# Patient Record
Sex: MALE | Race: WHITE | NOT HISPANIC OR LATINO | Employment: FULL TIME | URBAN - METROPOLITAN AREA
[De-identification: names, ages, dates, MRNs, and addresses within clinical notes are randomized per-mention and may not be internally consistent; named-entity substitution may affect disease eponyms.]

---

## 2017-11-15 ENCOUNTER — OFFICE VISIT (OUTPATIENT)
Dept: SPORTS MEDICINE | Facility: CLINIC | Age: 57
End: 2017-11-15

## 2017-11-15 ENCOUNTER — RESULTS ENCOUNTER (OUTPATIENT)
Dept: SPORTS MEDICINE | Facility: CLINIC | Age: 57
End: 2017-11-15

## 2017-11-15 VITALS
BODY MASS INDEX: 23.62 KG/M2 | DIASTOLIC BLOOD PRESSURE: 86 MMHG | WEIGHT: 165 LBS | HEIGHT: 70 IN | SYSTOLIC BLOOD PRESSURE: 104 MMHG | OXYGEN SATURATION: 98 % | HEART RATE: 62 BPM | RESPIRATION RATE: 16 BRPM

## 2017-11-15 DIAGNOSIS — Z00.00 ANNUAL PHYSICAL EXAM: Primary | ICD-10-CM

## 2017-11-15 DIAGNOSIS — Z00.00 ANNUAL PHYSICAL EXAM: ICD-10-CM

## 2017-11-15 PROCEDURE — 99396 PREV VISIT EST AGE 40-64: CPT | Performed by: FAMILY MEDICINE

## 2017-11-15 PROCEDURE — 90715 TDAP VACCINE 7 YRS/> IM: CPT | Performed by: FAMILY MEDICINE

## 2017-11-15 PROCEDURE — 90471 IMMUNIZATION ADMIN: CPT | Performed by: FAMILY MEDICINE

## 2017-11-15 RX ORDER — CETIRIZINE HYDROCHLORIDE 10 MG/1
10 TABLET ORAL DAILY
COMMUNITY

## 2017-11-15 NOTE — PROGRESS NOTES
"Yevgeniy Rodriguez is here today to establish care, for an annual physical exam. Saw Dr. Gomez a few years ago.    Eating a healthy diet. Exercising routinely. Runs sprint triathlons. Exercises 5 days a week.    Mother dx with liver CA age 64.  She was having excessive bruising that was not resolving at the onset of her diagnosis.    I have reviewed the patient's medical, family, and social history in detail and updated the computerized patient record.    Screening history:  Colonoscopy - @ age 51, polyps, hemorrhoids  Prostate - no fam hx  Metabolic - last year    Health Maintenance   Topic Date Due   • TDAP/TD VACCINES (1 - Tdap) 12/04/1980   • HEPATITIS C SCREENING  11/15/2017   • COLONOSCOPY  01/01/2022   • INFLUENZA VACCINE  Completed       Review of Systems   Constitutional: Negative for chills, fatigue and fever.   HENT: Negative for postnasal drip and sore throat.    Eyes: Negative for pain.   Respiratory: Negative for cough, chest tightness and wheezing.    Cardiovascular: Negative for chest pain.   Gastrointestinal: Negative.    Musculoskeletal: Negative for myalgias.   Skin: Negative for rash.   Neurological: Negative for numbness and headaches.   Psychiatric/Behavioral: Negative.    All other systems reviewed and are negative.      /86 (BP Location: Right arm, Patient Position: Sitting, Cuff Size: Adult)  Pulse 62  Resp 16  Ht 70\" (177.8 cm)  Wt 165 lb (74.8 kg)  SpO2 98%  BMI 23.68 kg/m2     Physical Exam    Vital signs reviewed.  General appearance: No acute distress  Eyes: conjunctiva clear without erythema; pupils equally round and reactive  ENT: external ears and nose normal; hearing normal, oropharynx clear  Neck: supple; no thyromegaly  CV: normal rate and rhythm; no peripheral edema  Respiratory: normal respiratory effort; lungs clear to auscultation bilaterally  MSK: normal gait and station; no focal joint deformity or swelling  Skin: no rash or wounds; normal turgor  Neuro: cranial " nerves 2-12 grossly intact; normal sensation to light touch  Psych: mood and affect normal; recent and remote memory intact      Yevgeniy was seen today for establish care.    Diagnoses and all orders for this visit:    Annual physical exam  -     Tdap Vaccine Greater Than or Equal To 8yo IM  -     Comprehensive Metabolic Panel; Future  -     Lipid Panel; Future  -     Urinalysis With / Culture If Indicated - Urine, Clean Catch; Future  -     PSA; Future  -     CBC & Differential; Future  -     Hepatitis C antibody; Future        Encourage healthy diet and exercise.  Encourage patient to stay up to date on screening examinations as indicated based on age and risk factors.    EMR Dragon/Transcription disclaimer:    Much of this encounter note is an electronic transcription/translation of spoken language to printed text.  The electronic translation of spoken language may permit erroneous, or at times, nonsensical words or phrases to be inadvertently transcribed.  Although I have reviewed the note for such errors some may still exist.

## 2017-11-20 ENCOUNTER — RESULTS ENCOUNTER (OUTPATIENT)
Dept: SPORTS MEDICINE | Facility: CLINIC | Age: 57
End: 2017-11-20

## 2017-11-20 DIAGNOSIS — Z00.00 ANNUAL PHYSICAL EXAM: ICD-10-CM

## 2017-11-23 LAB
ALBUMIN SERPL-MCNC: 4.4 G/DL (ref 3.5–5.2)
ALBUMIN/GLOB SERPL: 1.8 G/DL
ALP SERPL-CCNC: 57 U/L (ref 39–117)
ALT SERPL-CCNC: 16 U/L (ref 1–41)
AST SERPL-CCNC: 15 U/L (ref 1–40)
BASOPHILS # BLD AUTO: 0.03 10*3/MM3 (ref 0–0.2)
BASOPHILS NFR BLD AUTO: 0.7 % (ref 0–1.5)
BILIRUB SERPL-MCNC: 0.6 MG/DL (ref 0.1–1.2)
BUN SERPL-MCNC: 15 MG/DL (ref 6–20)
BUN/CREAT SERPL: 15.6 (ref 7–25)
CALCIUM SERPL-MCNC: 9.6 MG/DL (ref 8.6–10.5)
CHLORIDE SERPL-SCNC: 100 MMOL/L (ref 98–107)
CHOLEST SERPL-MCNC: 173 MG/DL (ref 0–200)
CO2 SERPL-SCNC: 28 MMOL/L (ref 22–29)
CREAT SERPL-MCNC: 0.96 MG/DL (ref 0.76–1.27)
EOSINOPHIL # BLD AUTO: 0.09 10*3/MM3 (ref 0–0.7)
EOSINOPHIL NFR BLD AUTO: 2.2 % (ref 0.3–6.2)
ERYTHROCYTE [DISTWIDTH] IN BLOOD BY AUTOMATED COUNT: 12.9 % (ref 11.5–14.5)
GFR SERPLBLD CREATININE-BSD FMLA CKD-EPI: 81 ML/MIN/1.73
GFR SERPLBLD CREATININE-BSD FMLA CKD-EPI: 99 ML/MIN/1.73
GLOBULIN SER CALC-MCNC: 2.4 GM/DL
GLUCOSE SERPL-MCNC: 91 MG/DL (ref 65–99)
HCT VFR BLD AUTO: 42.5 % (ref 40.4–52.2)
HCV AB S/CO SERPL IA: 0.1 S/CO RATIO (ref 0–0.9)
HDLC SERPL-MCNC: 56 MG/DL (ref 40–60)
HGB BLD-MCNC: 13.9 G/DL (ref 13.7–17.6)
IMM GRANULOCYTES # BLD: 0 10*3/MM3 (ref 0–0.03)
IMM GRANULOCYTES NFR BLD: 0 % (ref 0–0.5)
LDLC SERPL CALC-MCNC: 96 MG/DL (ref 0–100)
LYMPHOCYTES # BLD AUTO: 1.68 10*3/MM3 (ref 0.9–4.8)
LYMPHOCYTES NFR BLD AUTO: 41.1 % (ref 19.6–45.3)
MCH RBC QN AUTO: 31.2 PG (ref 27–32.7)
MCHC RBC AUTO-ENTMCNC: 32.7 G/DL (ref 32.6–36.4)
MCV RBC AUTO: 95.5 FL (ref 79.8–96.2)
MONOCYTES # BLD AUTO: 0.39 10*3/MM3 (ref 0.2–1.2)
MONOCYTES NFR BLD AUTO: 9.5 % (ref 5–12)
NEUTROPHILS # BLD AUTO: 1.9 10*3/MM3 (ref 1.9–8.1)
NEUTROPHILS NFR BLD AUTO: 46.5 % (ref 42.7–76)
PLATELET # BLD AUTO: 241 10*3/MM3 (ref 140–500)
POTASSIUM SERPL-SCNC: 4.8 MMOL/L (ref 3.5–5.2)
PROT SERPL-MCNC: 6.8 G/DL (ref 6–8.5)
PSA SERPL-MCNC: 0.78 NG/ML (ref 0–4)
RBC # BLD AUTO: 4.45 10*6/MM3 (ref 4.6–6)
SODIUM SERPL-SCNC: 140 MMOL/L (ref 136–145)
TRIGL SERPL-MCNC: 106 MG/DL (ref 0–150)
VLDLC SERPL CALC-MCNC: 21.2 MG/DL (ref 5–40)
WBC # BLD AUTO: 4.09 10*3/MM3 (ref 4.5–10.7)

## 2018-01-11 ENCOUNTER — OFFICE VISIT (OUTPATIENT)
Dept: SPORTS MEDICINE | Facility: CLINIC | Age: 58
End: 2018-01-11

## 2018-01-11 VITALS
OXYGEN SATURATION: 98 % | WEIGHT: 178.8 LBS | SYSTOLIC BLOOD PRESSURE: 114 MMHG | DIASTOLIC BLOOD PRESSURE: 62 MMHG | HEART RATE: 60 BPM | TEMPERATURE: 98.7 F | BODY MASS INDEX: 25.6 KG/M2 | HEIGHT: 70 IN

## 2018-01-11 DIAGNOSIS — J01.00 ACUTE NON-RECURRENT MAXILLARY SINUSITIS: Primary | ICD-10-CM

## 2018-01-11 DIAGNOSIS — R05.9 COUGH: ICD-10-CM

## 2018-01-11 PROCEDURE — 99214 OFFICE O/P EST MOD 30 MIN: CPT | Performed by: FAMILY MEDICINE

## 2018-01-11 PROCEDURE — 71046 X-RAY EXAM CHEST 2 VIEWS: CPT | Performed by: FAMILY MEDICINE

## 2018-01-11 RX ORDER — AMOXICILLIN AND CLAVULANATE POTASSIUM 875; 125 MG/1; MG/1
1 TABLET, FILM COATED ORAL 2 TIMES DAILY
Qty: 20 TABLET | Refills: 0 | Status: SHIPPED | OUTPATIENT
Start: 2018-01-11 | End: 2018-01-21

## 2018-01-11 NOTE — PROGRESS NOTES
"Yevgeniy is a 57 y.o. year old male    Chief Complaint   Patient presents with   • Cough     coughing up green and brown mucus    • Fatigue       History of Present Illness   Cough   This is a new problem. The current episode started 1 to 4 weeks ago. The problem has been gradually improving. The cough is productive of brown sputum. Associated symptoms include nasal congestion and postnasal drip. Pertinent negatives include no chest pain, chills, fever, headaches, myalgias, rash, rhinorrhea or shortness of breath.   Fatigue   Associated symptoms include coughing and fatigue. Pertinent negatives include no abdominal pain, arthralgias, chest pain, chills, congestion, fever, headaches, myalgias, numbness or rash.      Treated for flu prior to [place].    I have reviewed the patient's medical, family, and social history in detail and updated the computerized patient record.    Review of Systems   Constitutional: Positive for fatigue. Negative for chills and fever.   HENT: Positive for postnasal drip. Negative for congestion, rhinorrhea and sinus pressure.    Respiratory: Positive for cough. Negative for chest tightness and shortness of breath.    Cardiovascular: Negative for chest pain.   Gastrointestinal: Negative for abdominal pain.   Musculoskeletal: Negative for arthralgias and myalgias.   Skin: Negative for rash.   Neurological: Negative for numbness and headaches.   All other systems reviewed and are negative.      /62 (BP Location: Left arm, Patient Position: Sitting, Cuff Size: Adult)  Pulse 60  Temp 98.7 °F (37.1 °C) (Oral)   Ht 177.8 cm (70\")  Wt 81.1 kg (178 lb 12.8 oz)  SpO2 98%  BMI 25.66 kg/m2     Physical Exam   Constitutional: He is oriented to person, place, and time. He appears well-developed and well-nourished.   HENT:   Head: Normocephalic and atraumatic.   Right Ear: External ear normal.   Left Ear: External ear normal.   Nose: Nose normal.   Mouth/Throat: Oropharynx is clear and moist. "   Eyes: EOM are normal.   Neck: Normal range of motion.   Cardiovascular: Normal rate and regular rhythm.    Pulmonary/Chest: Effort normal and breath sounds normal.   Neurological: He is alert and oriented to person, place, and time.   Skin: Skin is warm and dry. No rash noted.   Psychiatric: He has a normal mood and affect. His behavior is normal.   Nursing note and vitals reviewed.    Chest X-Ray  Indication: Cough  Views: PA and Lateral    Findings:  Normal lung markings.  No pleural effusion.  Heart size and shape are normal.  Mediastinum is normal.  No visible rib fractures.   Overall, normal chest.    There were no previous studies available for comparison.       Diagnoses and all orders for this visit:    Acute non-recurrent maxillary sinusitis  -     amoxicillin-clavulanate (AUGMENTIN) 875-125 MG per tablet; Take 1 tablet by mouth 2 (Two) Times a Day for 10 days.    Cough  -     XR Chest 2 View             EMR Dragon/Transcription disclaimer:    Much of this encounter note is an electronic transcription/translation of spoken language to printed text.  The electronic translation of spoken language may permit erroneous, or at times, nonsensical words or phrases to be inadvertently transcribed.  Although I have reviewed the note for such errors some may still exist.

## 2019-04-04 ENCOUNTER — OFFICE VISIT (OUTPATIENT)
Dept: SPORTS MEDICINE | Facility: CLINIC | Age: 59
End: 2019-04-04

## 2019-04-04 VITALS
HEIGHT: 70 IN | WEIGHT: 178 LBS | DIASTOLIC BLOOD PRESSURE: 70 MMHG | OXYGEN SATURATION: 97 % | HEART RATE: 62 BPM | BODY MASS INDEX: 25.48 KG/M2 | SYSTOLIC BLOOD PRESSURE: 114 MMHG

## 2019-04-04 DIAGNOSIS — Z23 IMMUNIZATION DUE: ICD-10-CM

## 2019-04-04 DIAGNOSIS — Z00.00 ANNUAL PHYSICAL EXAM: Primary | ICD-10-CM

## 2019-04-04 PROCEDURE — 99396 PREV VISIT EST AGE 40-64: CPT | Performed by: FAMILY MEDICINE

## 2019-04-04 NOTE — PROGRESS NOTES
"Yevgeniy Rodriguez is here today for an annual physical exam.     Eating a healthy diet. Exercising routinely.     Biometric screening next wk.    Seeing Dr. Giuliana Carrasco for HRT. Considering testosterone replacement next wk per pt. Was going to 25 again in past but quit on own volition.    I have reviewed the patient's medical, family, and social history in detail and updated the computerized patient record.    Health Maintenance   Topic Date Due   • ZOSTER VACCINE (1 of 2) 12/04/2011   • ANNUAL PHYSICAL  11/16/2018   • INFLUENZA VACCINE  08/01/2019   • COLONOSCOPY  01/01/2022   • TDAP/TD VACCINES (2 - Td) 11/15/2027   • HEPATITIS C SCREENING  Completed       PHQ-2 Depression Screening  Little interest or pleasure in doing things?     Feeling down, depressed, or hopeless?     PHQ-2 Total Score         Review of Systems  Constitutional: Negative for chills, fatigue and fever.   HENT: Negative for postnasal drip and sore throat.    Eyes: Negative for pain.   Respiratory: Negative for cough, chest tightness and wheezing.    Cardiovascular: Negative for chest pain.   Gastrointestinal: Negative.    Musculoskeletal: Negative for myalgias.   Skin: Negative for rash.   Neurological: Negative for numbness and headaches.   Psychiatric/Behavioral: Negative.    All other systems reviewed and are negative.    /70   Pulse 62   Ht 177.8 cm (70\")   Wt 80.7 kg (178 lb)   SpO2 97%   BMI 25.54 kg/m²      Physical Exam    Vital signs reviewed.  General appearance: No acute distress  Eyes: conjunctiva clear without erythema; pupils equally round and reactive  ENT: external ears and nose normal; hearing normal, oropharynx clear  Neck: supple; no thyromegaly  CV: normal rate and rhythm; no peripheral edema  Respiratory: normal respiratory effort; lungs clear to auscultation bilaterally  MSK: normal gait and station; no focal joint deformity or swelling  Skin: no rash or wounds; normal turgor  Neuro: cranial nerves 2-12 grossly " intact; normal sensation to light touch  Psych: mood and affect normal; recent and remote memory intact  : skin tag at 12 o'clock; no hemorrhoids; JAYCEE WNL    No visits with results within 2 Week(s) from this visit.   Latest known visit with results is:   Results Encounter on 11/20/2017   Component Date Value Ref Range Status   • Glucose 11/22/2017 91  65 - 99 mg/dL Final   • BUN 11/22/2017 15  6 - 20 mg/dL Final   • Creatinine 11/22/2017 0.96  0.76 - 1.27 mg/dL Final   • eGFR Non  Am 11/22/2017 81  >60 mL/min/1.73 Final   • eGFR African Am 11/22/2017 99  >60 mL/min/1.73 Final   • BUN/Creatinine Ratio 11/22/2017 15.6  7.0 - 25.0 Final   • Sodium 11/22/2017 140  136 - 145 mmol/L Final   • Potassium 11/22/2017 4.8  3.5 - 5.2 mmol/L Final   • Chloride 11/22/2017 100  98 - 107 mmol/L Final   • Total CO2 11/22/2017 28.0  22.0 - 29.0 mmol/L Final   • Calcium 11/22/2017 9.6  8.6 - 10.5 mg/dL Final   • Total Protein 11/22/2017 6.8  6.0 - 8.5 g/dL Final   • Albumin 11/22/2017 4.40  3.50 - 5.20 g/dL Final   • Globulin 11/22/2017 2.4  gm/dL Final   • A/G Ratio 11/22/2017 1.8  g/dL Final   • Total Bilirubin 11/22/2017 0.6  0.1 - 1.2 mg/dL Final   • Alkaline Phosphatase 11/22/2017 57  39 - 117 U/L Final   • AST (SGOT) 11/22/2017 15  1 - 40 U/L Final   • ALT (SGPT) 11/22/2017 16  1 - 41 U/L Final   • Total Cholesterol 11/22/2017 173  0 - 200 mg/dL Final   • Triglycerides 11/22/2017 106  0 - 150 mg/dL Final   • HDL Cholesterol 11/22/2017 56  40 - 60 mg/dL Final   • VLDL Cholesterol 11/22/2017 21.2  5 - 40 mg/dL Final   • LDL Cholesterol  11/22/2017 96  0 - 100 mg/dL Final   • PSA 11/22/2017 0.776  0.000 - 4.000 ng/mL Final   • WBC 11/22/2017 4.09* 4.50 - 10.70 10*3/mm3 Final   • RBC 11/22/2017 4.45* 4.60 - 6.00 10*6/mm3 Final   • Hemoglobin 11/22/2017 13.9  13.7 - 17.6 g/dL Final   • Hematocrit 11/22/2017 42.5  40.4 - 52.2 % Final   • MCV 11/22/2017 95.5  79.8 - 96.2 fL Final   • MCH 11/22/2017 31.2  27.0 - 32.7 pg Final    • MCHC 11/22/2017 32.7  32.6 - 36.4 g/dL Final   • RDW 11/22/2017 12.9  11.5 - 14.5 % Final   • Platelets 11/22/2017 241  140 - 500 10*3/mm3 Final   • Neutrophil Rel % 11/22/2017 46.5  42.7 - 76.0 % Final   • Lymphocyte Rel % 11/22/2017 41.1  19.6 - 45.3 % Final   • Monocyte Rel % 11/22/2017 9.5  5.0 - 12.0 % Final   • Eosinophil Rel % 11/22/2017 2.2  0.3 - 6.2 % Final   • Basophil Rel % 11/22/2017 0.7  0.0 - 1.5 % Final   • Neutrophils Absolute 11/22/2017 1.90  1.90 - 8.10 10*3/mm3 Final   • Lymphocytes Absolute 11/22/2017 1.68  0.90 - 4.80 10*3/mm3 Final   • Monocytes Absolute 11/22/2017 0.39  0.20 - 1.20 10*3/mm3 Final   • Eosinophils Absolute 11/22/2017 0.09  0.00 - 0.70 10*3/mm3 Final   • Basophils Absolute 11/22/2017 0.03  0.00 - 0.20 10*3/mm3 Final   • Immature Granulocyte Rel % 11/22/2017 0.0  0.0 - 0.5 % Final   • Immature Grans Absolute 11/22/2017 0.00  0.00 - 0.03 10*3/mm3 Final   • Hep C Virus Ab 11/22/2017 0.1  0.0 - 0.9 s/co ratio Final    Comment:                                   Negative:     < 0.8                               Indeterminate: 0.8 - 0.9                                    Positive:     > 0.9   The CDC recommends that a positive HCV antibody result   be followed up with a HCV Nucleic Acid Amplification   test (026997).          Yevgeniy was seen today for personal problem.    Diagnoses and all orders for this visit:    Annual physical exam    Immunization due  -     Zoster Vac Recomb Adjuvanted (SHINGRIX) 50 MCG/0.5ML reconstituted suspension; Inject 50 mcg into the appropriate muscle as directed by prescriber 1 (One) Time for 1 dose.      Will c/b with blood work that has already been done. Add if indicated.    Encourage healthy diet and exercise.  Encourage patient to stay up to date on screening examinations as indicated based on age and risk factors.    EMR Dragon/Transcription disclaimer:    Much of this encounter note is an electronic transcription/translation of spoken language  to printed text.  The electronic translation of spoken language may permit erroneous, or at times, nonsensical words or phrases to be inadvertently transcribed.  Although I have reviewed the note for such errors some may still exist.

## 2019-04-09 LAB
ALBUMIN SERPL-MCNC: 4.4 G/DL (ref 3.5–5.5)
ALBUMIN/GLOB SERPL: 2 {RATIO} (ref 1.2–2.2)
ALP SERPL-CCNC: 58 IU/L (ref 39–117)
ALT SERPL-CCNC: 20 IU/L (ref 0–44)
APPEARANCE UR: CLEAR
AST SERPL-CCNC: 28 IU/L (ref 0–40)
BACTERIA #/AREA URNS HPF: ABNORMAL /[HPF]
BASOPHILS # BLD AUTO: 0 X10E3/UL (ref 0–0.2)
BASOPHILS NFR BLD AUTO: 1 %
BILIRUB SERPL-MCNC: 0.4 MG/DL (ref 0–1.2)
BILIRUB UR QL STRIP: NEGATIVE
BUN SERPL-MCNC: 16 MG/DL (ref 6–24)
BUN/CREAT SERPL: 16 (ref 9–20)
CALCIUM SERPL-MCNC: 9.1 MG/DL (ref 8.7–10.2)
CHLORIDE SERPL-SCNC: 103 MMOL/L (ref 96–106)
CHOLEST SERPL-MCNC: 169 MG/DL (ref 100–199)
CO2 SERPL-SCNC: 22 MMOL/L (ref 20–29)
COLOR UR: YELLOW
CREAT SERPL-MCNC: 1.03 MG/DL (ref 0.76–1.27)
CRYSTALS URNS MICRO: ABNORMAL
EOSINOPHIL # BLD AUTO: 0.1 X10E3/UL (ref 0–0.4)
EOSINOPHIL NFR BLD AUTO: 2 %
EPI CELLS #/AREA URNS HPF: ABNORMAL /HPF
ERYTHROCYTE [DISTWIDTH] IN BLOOD BY AUTOMATED COUNT: 13.8 % (ref 12.3–15.4)
GLOBULIN SER CALC-MCNC: 2.2 G/DL (ref 1.5–4.5)
GLUCOSE SERPL-MCNC: 96 MG/DL (ref 65–99)
GLUCOSE UR QL: NEGATIVE
HCT VFR BLD AUTO: 41 % (ref 37.5–51)
HDLC SERPL-MCNC: 52 MG/DL
HGB BLD-MCNC: 13.8 G/DL (ref 13–17.7)
HGB UR QL STRIP: NEGATIVE
IMM GRANULOCYTES # BLD AUTO: 0 X10E3/UL (ref 0–0.1)
IMM GRANULOCYTES NFR BLD AUTO: 0 %
KETONES UR QL STRIP: ABNORMAL
LDLC SERPL CALC-MCNC: 101 MG/DL (ref 0–99)
LEUKOCYTE ESTERASE UR QL STRIP: NEGATIVE
LYMPHOCYTES # BLD AUTO: 1.6 X10E3/UL (ref 0.7–3.1)
LYMPHOCYTES NFR BLD AUTO: 41 %
MCH RBC QN AUTO: 30.6 PG (ref 26.6–33)
MCHC RBC AUTO-ENTMCNC: 33.7 G/DL (ref 31.5–35.7)
MCV RBC AUTO: 91 FL (ref 79–97)
MICRO URNS: ABNORMAL
MICRO URNS: ABNORMAL
MONOCYTES # BLD AUTO: 0.5 X10E3/UL (ref 0.1–0.9)
MONOCYTES NFR BLD AUTO: 11 %
MUCOUS THREADS URNS QL MICRO: PRESENT
NEUTROPHILS # BLD AUTO: 1.8 X10E3/UL (ref 1.4–7)
NEUTROPHILS NFR BLD AUTO: 45 %
NITRITE UR QL STRIP: NEGATIVE
PH UR STRIP: 5.5 [PH] (ref 5–7.5)
PLATELET # BLD AUTO: 206 X10E3/UL (ref 150–379)
POTASSIUM SERPL-SCNC: 4.3 MMOL/L (ref 3.5–5.2)
PROT SERPL-MCNC: 6.6 G/DL (ref 6–8.5)
PROT UR QL STRIP: NEGATIVE
PSA SERPL-MCNC: 0.9 NG/ML (ref 0–4)
RBC # BLD AUTO: 4.51 X10E6/UL (ref 4.14–5.8)
RBC #/AREA URNS HPF: ABNORMAL /HPF
SODIUM SERPL-SCNC: 142 MMOL/L (ref 134–144)
SP GR UR: 1.02 (ref 1–1.03)
TRIGL SERPL-MCNC: 82 MG/DL (ref 0–149)
UNIDENT CRYS URNS QL MICRO: PRESENT
URINALYSIS REFLEX: ABNORMAL
UROBILINOGEN UR STRIP-MCNC: 0.2 MG/DL (ref 0.2–1)
VLDLC SERPL CALC-MCNC: 16 MG/DL (ref 5–40)
WBC # BLD AUTO: 4 X10E3/UL (ref 3.4–10.8)
WBC #/AREA URNS HPF: ABNORMAL /HPF

## 2019-05-07 ENCOUNTER — OFFICE VISIT (OUTPATIENT)
Dept: SPORTS MEDICINE | Facility: CLINIC | Age: 59
End: 2019-05-07

## 2019-05-07 VITALS
DIASTOLIC BLOOD PRESSURE: 76 MMHG | BODY MASS INDEX: 25.05 KG/M2 | HEIGHT: 70 IN | OXYGEN SATURATION: 97 % | WEIGHT: 175 LBS | HEART RATE: 58 BPM | SYSTOLIC BLOOD PRESSURE: 116 MMHG

## 2019-05-07 DIAGNOSIS — J01.00 ACUTE NON-RECURRENT MAXILLARY SINUSITIS: Primary | ICD-10-CM

## 2019-05-07 DIAGNOSIS — R53.83 FATIGUE, UNSPECIFIED TYPE: ICD-10-CM

## 2019-05-07 PROCEDURE — 99214 OFFICE O/P EST MOD 30 MIN: CPT | Performed by: FAMILY MEDICINE

## 2019-05-07 RX ORDER — AMOXICILLIN AND CLAVULANATE POTASSIUM 875; 125 MG/1; MG/1
1 TABLET, FILM COATED ORAL 2 TIMES DAILY
Qty: 20 TABLET | Refills: 0 | Status: SHIPPED | OUTPATIENT
Start: 2019-05-07 | End: 2019-05-17

## 2019-07-01 ENCOUNTER — OFFICE VISIT (OUTPATIENT)
Dept: SPORTS MEDICINE | Facility: CLINIC | Age: 59
End: 2019-07-01

## 2019-07-01 VITALS
DIASTOLIC BLOOD PRESSURE: 58 MMHG | BODY MASS INDEX: 25.62 KG/M2 | OXYGEN SATURATION: 94 % | WEIGHT: 179 LBS | SYSTOLIC BLOOD PRESSURE: 96 MMHG | HEIGHT: 70 IN | HEART RATE: 56 BPM

## 2019-07-01 DIAGNOSIS — R53.83 FATIGUE, UNSPECIFIED TYPE: Primary | ICD-10-CM

## 2019-07-01 DIAGNOSIS — G47.19 EXCESSIVE DAYTIME SLEEPINESS: ICD-10-CM

## 2019-07-01 DIAGNOSIS — J30.1 SEASONAL ALLERGIC RHINITIS DUE TO POLLEN: ICD-10-CM

## 2019-07-01 PROCEDURE — 99214 OFFICE O/P EST MOD 30 MIN: CPT | Performed by: FAMILY MEDICINE

## 2019-07-01 RX ORDER — LEVOTHYROXINE, LIOTHYRONINE 19; 4.5 UG/1; UG/1
30 TABLET ORAL DAILY
COMMUNITY
Start: 2019-06-24 | End: 2021-04-14

## 2019-07-01 NOTE — PROGRESS NOTES
"Yevgeniy is a 58 y.o. year old male evaluation of a problem that is new to this examiner.    Chief Complaint   Patient presents with   • Fatigue     states that he has been feeling tired.        History of Present Illness   HPI     Here for further evaluation of fatigue.  He has been seeing someone outside the office for similar complaint.  Has recently been started on [place] Thyroid.  Has also been to sleep specialist where a home study was done which was inconclusive.  Reports taking a nap on Saturday which is unusual for him.  Has been having lingering allergy symptoms and started Flonase on his own Friday which seemed to have helped somewhat.  Does not associate any infectious symptoms such as fever, sinus pressure, sore throat, postnasal drip or cough.  He has had daytime sleepiness,     I have reviewed the patient's medical, family, and social history in detail and updated the computerized patient record.    Review of Systems   Constitutional: Positive for fatigue. Negative for chills and fever.   HENT: Negative for congestion, rhinorrhea and sinus pressure.    Respiratory: Negative for chest tightness and shortness of breath.    Cardiovascular: Negative for chest pain.   Gastrointestinal: Negative for abdominal pain.   Musculoskeletal: Negative for arthralgias.   Skin: Negative for rash.   Neurological: Negative for numbness and headaches.   All other systems reviewed and are negative.      BP 96/58 (BP Location: Left arm, Patient Position: Sitting, Cuff Size: Adult)   Pulse 56   Ht 177.8 cm (70\")   Wt 81.2 kg (179 lb)   SpO2 94%   BMI 25.68 kg/m²      Physical Exam   Constitutional: He is oriented to person, place, and time. He appears well-developed and well-nourished.   HENT:   Head: Normocephalic and atraumatic.   Right Ear: External ear normal.   Left Ear: External ear normal.   Nose: Nose normal.   Mouth/Throat: Oropharynx is clear and moist.   Eyes: EOM are normal.   Neck: Normal range of motion. "   Cardiovascular: Normal rate and regular rhythm.   Pulmonary/Chest: Effort normal and breath sounds normal.   Neurological: He is alert and oriented to person, place, and time.   Skin: Skin is warm and dry. No rash noted.   Psychiatric: He has a normal mood and affect. His behavior is normal.   Nursing note and vitals reviewed.      Office Visit on 04/04/2019   Component Date Value Ref Range Status   • WBC 04/08/2019 4.0  3.4 - 10.8 x10E3/uL Final   • RBC 04/08/2019 4.51  4.14 - 5.80 x10E6/uL Final   • Hemoglobin 04/08/2019 13.8  13.0 - 17.7 g/dL Final   • Hematocrit 04/08/2019 41.0  37.5 - 51.0 % Final   • MCV 04/08/2019 91  79 - 97 fL Final   • MCH 04/08/2019 30.6  26.6 - 33.0 pg Final   • MCHC 04/08/2019 33.7  31.5 - 35.7 g/dL Final   • RDW 04/08/2019 13.8  12.3 - 15.4 % Final   • Platelets 04/08/2019 206  150 - 379 x10E3/uL Final   • Neutrophil Rel % 04/08/2019 45  Not Estab. % Final   • Lymphocyte Rel % 04/08/2019 41  Not Estab. % Final   • Monocyte Rel % 04/08/2019 11  Not Estab. % Final   • Eosinophil Rel % 04/08/2019 2  Not Estab. % Final   • Basophil Rel % 04/08/2019 1  Not Estab. % Final   • Neutrophils Absolute 04/08/2019 1.8  1.4 - 7.0 x10E3/uL Final   • Lymphocytes Absolute 04/08/2019 1.6  0.7 - 3.1 x10E3/uL Final   • Monocytes Absolute 04/08/2019 0.5  0.1 - 0.9 x10E3/uL Final   • Eosinophils Absolute 04/08/2019 0.1  0.0 - 0.4 x10E3/uL Final   • Basophils Absolute 04/08/2019 0.0  0.0 - 0.2 x10E3/uL Final   • Immature Granulocyte Rel % 04/08/2019 0  Not Estab. % Final   • Immature Grans Absolute 04/08/2019 0.0  0.0 - 0.1 x10E3/uL Final   • Glucose 04/08/2019 96  65 - 99 mg/dL Final   • BUN 04/08/2019 16  6 - 24 mg/dL Final   • Creatinine 04/08/2019 1.03  0.76 - 1.27 mg/dL Final   • eGFR Non African Am 04/08/2019 80  >59 mL/min/1.73 Final   • eGFR African Am 04/08/2019 93  >59 mL/min/1.73 Final   • BUN/Creatinine Ratio 04/08/2019 16  9 - 20 Final   • Sodium 04/08/2019 142  134 - 144 mmol/L Final   •  Potassium 04/08/2019 4.3  3.5 - 5.2 mmol/L Final   • Chloride 04/08/2019 103  96 - 106 mmol/L Final   • Total CO2 04/08/2019 22  20 - 29 mmol/L Final   • Calcium 04/08/2019 9.1  8.7 - 10.2 mg/dL Final   • Total Protein 04/08/2019 6.6  6.0 - 8.5 g/dL Final   • Albumin 04/08/2019 4.4  3.5 - 5.5 g/dL Final   • Globulin 04/08/2019 2.2  1.5 - 4.5 g/dL Final   • A/G Ratio 04/08/2019 2.0  1.2 - 2.2 Final   • Total Bilirubin 04/08/2019 0.4  0.0 - 1.2 mg/dL Final   • Alkaline Phosphatase 04/08/2019 58  39 - 117 IU/L Final   • AST (SGOT) 04/08/2019 28  0 - 40 IU/L Final   • ALT (SGPT) 04/08/2019 20  0 - 44 IU/L Final   • PSA 04/08/2019 0.9  0.0 - 4.0 ng/mL Final    Comment: Roche ECLIA methodology.  According to the American Urological Association, Serum PSA should  decrease and remain at undetectable levels after radical  prostatectomy. The AUA defines biochemical recurrence as an initial  PSA value 0.2 ng/mL or greater followed by a subsequent confirmatory  PSA value 0.2 ng/mL or greater.  Values obtained with different assay methods or kits cannot be used  interchangeably. Results cannot be interpreted as absolute evidence  of the presence or absence of malignant disease.     • Total Cholesterol 04/08/2019 169  100 - 199 mg/dL Final   • Triglycerides 04/08/2019 82  0 - 149 mg/dL Final   • HDL Cholesterol 04/08/2019 52  >39 mg/dL Final   • VLDL Cholesterol 04/08/2019 16  5 - 40 mg/dL Final   • LDL Cholesterol  04/08/2019 101* 0 - 99 mg/dL Final   • Specific Gravity, UA 04/08/2019 1.023  1.005 - 1.030 Final   • pH, UA 04/08/2019 5.5  5.0 - 7.5 Final   • Color, UA 04/08/2019 Yellow  Yellow Final   • Appearance, UA 04/08/2019 Clear  Clear Final   • Leukocytes, UA 04/08/2019 Negative  Negative Final   • Protein 04/08/2019 Negative  Negative/Trace Final   • Glucose, UA 04/08/2019 Negative  Negative Final   • Ketones 04/08/2019 Trace* Negative Final   • Blood, UA 04/08/2019 Negative  Negative Final   • Bilirubin, UA 04/08/2019  Negative  Negative Final   • Urobilinogen, UA 04/08/2019 0.2  0.2 - 1.0 mg/dL Final   • Nitrite, UA 04/08/2019 Negative  Negative Final   • Microscopic Examination 04/08/2019 Comment   Final    Microscopic follows if indicated.   • Microscopic Examination 04/08/2019 See below:   Final    Microscopic was indicated and was performed.   • Urinalysis Reflex 04/08/2019 Comment   Final    This specimen will not reflex to a Urine Culture.   • WBC, UA 04/08/2019 0-5  0 - 5 /hpf Final   • RBC, UA 04/08/2019 0-2  0 - 2 /hpf Final   • Epithelial Cells (non renal) 04/08/2019 None seen  0 - 10 /hpf Final   • Crystals, UA 04/08/2019 Present* N/A Final   • Crystal Type 04/08/2019 Calcium Oxalate  N/A Final   • Mucus, UA 04/08/2019 Present  Not Estab. Final   • Bacteria, UA 04/08/2019 None seen  None seen/Few Final         Current Outpatient Medications:   •  cetirizine (zyrTEC) 10 MG tablet, Take 10 mg by mouth Daily., Disp: , Rfl:   •  NP THYROID 30 MG tablet, Take 30 mg by mouth Daily., Disp: , Rfl:      Diagnoses and all orders for this visit:    Fatigue, unspecified type    Seasonal allergic rhinitis due to pollen    Excessive daytime sleepiness    Other orders  -     NP THYROID 30 MG tablet; Take 30 mg by mouth Daily.       Reviewed last blood work with Yevgeniy on my records.  Do not see identifiable cause for fatigue.  Does not have infectious symptoms for fatigue.  Some of his symptoms could be related to uncontrolled seasonal allergies and I encouraged him to continue with Flonase for at least 2-week trial.  If he is still having daytime sleepiness, I would encourage him to complete second sleep study which was recommended to him.    I spent greater than 50% of this 25 minute visit discussing the diagnosis, prognosis, treatment plan, etc. Patient's questions were answered in detail with appropriate counseling and anticipatory guidance.     EMR Dragon/Transcription disclaimer:    Much of this encounter note is an electronic  transcription/translation of spoken language to printed text.  The electronic translation of spoken language may permit erroneous, or at times, nonsensical words or phrases to be inadvertently transcribed.  Although I have reviewed the note for such errors some may still exist.

## 2020-01-26 NOTE — PROGRESS NOTES
"Yevgeniy is a 58 y.o. year old male    Chief Complaint   Patient presents with   • Sinusitis     states that he feels lathargic, feels congested        History of Present Illness  Symptom onset 1+ wk ago. Initially had prod cough with green tinged sputum but now having clear sputum. No fever. Cough worse at night. Also associates fatigue, groggy sensation. Has tried Zyrtec, Flonase qam, Benadryl qhs.    I have reviewed the patient's medical, family, and social history in detail and updated the computerized patient record.    Review of Systems  Constitutional: Per HPI.  HEENT: Per HPI  CV: no palpitations  Resp: Per HPI  Musculoskeletal: Negative for myalgias or arthralgias.  Skin: Negative for rash.   Neurological: Negative for weakness and numbness.   Psychiatric/Behavioral: Negative for sleep disturbance.   All other systems reviewed and are negative.    /76 (BP Location: Left arm, Patient Position: Sitting, Cuff Size: Adult)   Pulse 58   Ht 177.8 cm (70\")   Wt 79.4 kg (175 lb)   SpO2 97%   BMI 25.11 kg/m²      Physical Exam    Vital signs reviewed.   General: No acute distress.  Eyes: conjunctiva clear; pupils equally round and reactive  ENT: external ears and nose atraumatic; oropharynx clear  Neck: no LAD  CV: RRR; no peripheral edema, 2+ distal pulses  Resp: CTA b/l; normal respiratory effort, no use of accessory muscles  Skin: no rashes or wounds; normal turgor  Psych: mood and affect appropriate; recent and remote memory intact  Neuro: sensation to light touch intact    Diagnoses and all orders for this visit:    Acute non-recurrent maxillary sinusitis  -     amoxicillin-clavulanate (AUGMENTIN) 875-125 MG per tablet; Take 1 tablet by mouth 2 (Two) Times a Day for 10 days.    Fatigue, unspecified type      Symptoms c/w sinus infxn. Believe fatigue to be related to sinus infxn - no other likely etiologies at this time. Complete ABX.    EMR Dragon/Transcription disclaimer:    Much of this encounter " Influenza Vaccination note is an electronic transcription/translation of spoken language to printed text.  The electronic translation of spoken language may permit erroneous, or at times, nonsensical words or phrases to be inadvertently transcribed.  Although I have reviewed the note for such errors some may still exist.

## 2020-06-03 ENCOUNTER — OFFICE VISIT (OUTPATIENT)
Dept: SPORTS MEDICINE | Facility: CLINIC | Age: 60
End: 2020-06-03

## 2020-06-03 VITALS
BODY MASS INDEX: 25.48 KG/M2 | OXYGEN SATURATION: 97 % | HEART RATE: 54 BPM | SYSTOLIC BLOOD PRESSURE: 120 MMHG | DIASTOLIC BLOOD PRESSURE: 82 MMHG | HEIGHT: 70 IN | WEIGHT: 178 LBS

## 2020-06-03 DIAGNOSIS — I83.811 VARICOSE VEINS OF RIGHT LOWER EXTREMITY WITH PAIN: ICD-10-CM

## 2020-06-03 DIAGNOSIS — I80.01 THROMBOPHLEBITIS OF SUPERFICIAL VEINS OF RIGHT LOWER EXTREMITY: Primary | ICD-10-CM

## 2020-06-03 PROCEDURE — 99213 OFFICE O/P EST LOW 20 MIN: CPT | Performed by: FAMILY MEDICINE

## 2020-06-03 NOTE — PROGRESS NOTES
"Yevgeniy is a 59 y.o. year old male evaluation of a problem that is new to this examiner.    Chief Complaint   Patient presents with   • Leg Pain     RT leg calf pain, patient reports some tenderness - NKI - problems with calf has been present for about 1 week now -        History of Present Illness   HPI     History of varicose veins.  Noticed swelling and increase in pain in the right lower leg approximately 1 week ago.  This has been gradually improving.  He has been wearing compression socks as well as taking a few doses of aspirin 81 mg.  No recent plane trips.  He was on a car ride for approximately 3 hours on Monday.  Has been active.  No history of cancer.  No shortness of breath.    I have reviewed the patient's medical, family, and social history in detail and updated the computerized patient record.    Review of Systems   Constitutional: Negative for chills, fatigue and fever.   HENT: Negative for congestion, rhinorrhea and sinus pressure.    Respiratory: Negative for chest tightness and shortness of breath.    Cardiovascular: Negative for chest pain.   Gastrointestinal: Negative for abdominal pain.   Musculoskeletal: Positive for myalgias. Negative for arthralgias.   Skin: Negative for rash.   Neurological: Negative for numbness and headaches.   All other systems reviewed and are negative.      /82 (BP Location: Right arm, Patient Position: Sitting, Cuff Size: Adult)   Pulse 54   Ht 177.8 cm (70\")   Wt 80.7 kg (178 lb)   SpO2 97%   BMI 25.54 kg/m²      Physical Exam   Constitutional: He is oriented to person, place, and time. Vital signs are normal. He appears well-developed and well-nourished.   HENT:   Head: Normocephalic and atraumatic.   Eyes: Conjunctivae and EOM are normal.   Pulmonary/Chest: No accessory muscle usage. No respiratory distress.   Musculoskeletal: He exhibits no deformity.        Right lower leg: He exhibits tenderness and swelling.        Legs:  Neurological: He is alert " and oriented to person, place, and time.   Skin: Skin is warm and dry. No rash noted.   Psychiatric: He has a normal mood and affect. His behavior is normal.   Nursing note and vitals reviewed.        Current Outpatient Medications:   •  cetirizine (zyrTEC) 10 MG tablet, Take 10 mg by mouth Daily., Disp: , Rfl:   •  NP THYROID 30 MG tablet, Take 30 mg by mouth Daily., Disp: , Rfl:      Diagnoses and all orders for this visit:    Thrombophlebitis of superficial veins of right lower extremity    Varicose veins of right lower extremity with pain         Reassurance given to him that this is likely superficial thrombophlebitis of a varicose vein.  He will continue to wear compression stockings.  Can continue activity ad jennifer.  Could try ice, anti-inflammatory.  States that he had history of bleeding hemorrhoids and easy bruising on aspirin 81 mg and for this reason, I do not recommend he continue that.    EMR Dragon/Transcription disclaimer:    Much of this encounter note is an electronic transcription/translation of spoken language to printed text.  The electronic translation of spoken language may permit erroneous, or at times, nonsensical words or phrases to be inadvertently transcribed.  Although I have reviewed the note for such errors some may still exist.

## 2020-06-15 ENCOUNTER — OFFICE VISIT (OUTPATIENT)
Dept: SPORTS MEDICINE | Facility: CLINIC | Age: 60
End: 2020-06-15

## 2020-06-15 VITALS
HEART RATE: 55 BPM | SYSTOLIC BLOOD PRESSURE: 130 MMHG | OXYGEN SATURATION: 98 % | WEIGHT: 178 LBS | DIASTOLIC BLOOD PRESSURE: 80 MMHG | BODY MASS INDEX: 25.48 KG/M2 | HEIGHT: 70 IN

## 2020-06-15 DIAGNOSIS — I80.01 THROMBOPHLEBITIS OF SUPERFICIAL VEINS OF RIGHT LOWER EXTREMITY: ICD-10-CM

## 2020-06-15 DIAGNOSIS — M25.571 ACUTE RIGHT ANKLE PAIN: Primary | ICD-10-CM

## 2020-06-15 PROCEDURE — 73610 X-RAY EXAM OF ANKLE: CPT | Performed by: FAMILY MEDICINE

## 2020-06-15 PROCEDURE — 99214 OFFICE O/P EST MOD 30 MIN: CPT | Performed by: FAMILY MEDICINE

## 2020-06-15 NOTE — PROGRESS NOTES
"Chief Complaint   Patient presents with   • Ankle Pain     RT ankle pain and swelling - patient previously seen 2 weeks ago for thrombophlebitis of the RLE - has been getting better, wearing compression stockings - reports today ankle pain is worse with swelling, having to compensate due to being unable to stand on the ankle for a long period of time - here today with new x--rays for further evaluation and treatment           History of Present Illness  Follow-up on superficial thrombophlebitis, worsening right ankle pain and swelling.  Seen by me 6/3/2020 and was having pain initially in his calf.  Since that visit, has been wearing compression sleeves which has helped significantly.  He has also been taking aspirin.  He noticed over the past few days, he developed swelling in the lateral ankle.  He has been able to exercise and does not associate significant pain.  Nor does he associate a specific injury.    I have reviewed the patient's medical, family, and social history in detail and updated the computerized patient record.    Review of Systems  Constitutional: Negative for fever.   Musculoskeletal:        Per HPI   Skin: Negative for rash.   Neurological: Negative for weakness and numbness.   Psychiatric/Behavioral: Negative for sleep disturbance.   All other systems reviewed and are negative.    /80 (BP Location: Left arm, Patient Position: Sitting, Cuff Size: Adult)   Pulse 55   Ht 177.8 cm (70\")   Wt 80.7 kg (178 lb)   SpO2 98%   BMI 25.54 kg/m²       Physical Exam    Vital signs reviewed.   General: No acute distress.  Eyes: conjunctiva clear; pupils equally round and reactive  ENT: external ears and nose atraumatic; oropharynx clear  CV: no peripheral edema, 2+ distal pulses  Resp: normal respiratory effort, no use of accessory muscles  Skin: no rashes or wounds; normal turgor  Psych: mood and affect appropriate; recent and remote memory intact  Neuro: sensation to light touch intact    MSK " Exam:  Right ankle: Soft tissue swelling along the lateral ankle.  Negative anterior drawer.  Full strength and range of motion.    Right Ankle X-Ray  Indication: Pain  Views: AP, Lateral, Mortise    Findings:  No fracture  No bony lesion  Soft tissues normal  Normal joint spaces    No prior studies available for comparison.    Yevgeniy was seen today for ankle pain.    Diagnoses and all orders for this visit:    Acute right ankle pain  -     XR Ankle 3+ View Right  -     Duplex Venous Lower Extremity - Right CAR; Future    Thrombophlebitis of superficial veins of right lower extremity  -     Duplex Venous Lower Extremity - Right CAR; Future        Discussed soft tissue swelling as this could be sequela of his superficial thrombophlebitis.  No bony abnormalities on x-ray and no injury pattern, doubt ankle sprain.  I will send him for duplex venous ultrasound and call with results.    EMR Dragon/Transcription disclaimer:    Much of this encounter note is an electronic transcription/translation of spoken language to printed text.  The electronic translation of spoken language may permit erroneous, or at times, nonsensical words or phrases to be inadvertently transcribed.  Although I have reviewed the note for such errors some may still exist.

## 2020-06-22 ENCOUNTER — TELEPHONE (OUTPATIENT)
Dept: SPORTS MEDICINE | Facility: CLINIC | Age: 60
End: 2020-06-22

## 2020-06-22 NOTE — TELEPHONE ENCOUNTER
Patient called in to check on the status of his US doppler order and when he will be scheduled.    Thanks  Callie

## 2020-06-23 ENCOUNTER — TELEPHONE (OUTPATIENT)
Dept: SPORTS MEDICINE | Facility: CLINIC | Age: 60
End: 2020-06-23

## 2020-06-23 ENCOUNTER — HOSPITAL ENCOUNTER (OUTPATIENT)
Dept: CARDIOLOGY | Facility: HOSPITAL | Age: 60
Discharge: HOME | End: 2020-06-23
Admitting: FAMILY MEDICINE

## 2020-06-23 DIAGNOSIS — I80.01 THROMBOPHLEBITIS OF SUPERFICIAL VEINS OF RIGHT LOWER EXTREMITY: ICD-10-CM

## 2020-06-23 DIAGNOSIS — I82.461 ACUTE DEEP VEIN THROMBOSIS (DVT) OF CALF MUSCLE VEIN OF RIGHT LOWER EXTREMITY (HCC): Primary | ICD-10-CM

## 2020-06-23 DIAGNOSIS — M25.571 ACUTE RIGHT ANKLE PAIN: ICD-10-CM

## 2020-06-23 LAB
BH CV LOW VAS RIGHT GASTRONEMIUS VESSEL: 1
BH CV LOW VAS RIGHT LESSER SAPH VESSEL: 1
BH CV LOW VAS RIGHT POSTERIOR TIBIAL VESSEL: 1
BH CV LOW VAS RIGHT VARICOSITY AK VESSEL: 1
BH CV LOWER VASCULAR LEFT COMMON FEMORAL AUGMENT: NORMAL
BH CV LOWER VASCULAR LEFT COMMON FEMORAL COMPETENT: NORMAL
BH CV LOWER VASCULAR LEFT COMMON FEMORAL COMPRESS: NORMAL
BH CV LOWER VASCULAR LEFT COMMON FEMORAL PHASIC: NORMAL
BH CV LOWER VASCULAR LEFT COMMON FEMORAL SPONT: NORMAL
BH CV LOWER VASCULAR RIGHT COMMON FEMORAL AUGMENT: NORMAL
BH CV LOWER VASCULAR RIGHT COMMON FEMORAL COMPETENT: NORMAL
BH CV LOWER VASCULAR RIGHT COMMON FEMORAL COMPRESS: NORMAL
BH CV LOWER VASCULAR RIGHT COMMON FEMORAL PHASIC: NORMAL
BH CV LOWER VASCULAR RIGHT COMMON FEMORAL SPONT: NORMAL
BH CV LOWER VASCULAR RIGHT DISTAL FEMORAL COMPRESS: NORMAL
BH CV LOWER VASCULAR RIGHT GASTRONEMIUS COMPRESS: NORMAL
BH CV LOWER VASCULAR RIGHT GASTRONEMIUS THROMBUS: NORMAL
BH CV LOWER VASCULAR RIGHT GREATER SAPH AK COMPRESS: NORMAL
BH CV LOWER VASCULAR RIGHT GREATER SAPH BK COMPRESS: NORMAL
BH CV LOWER VASCULAR RIGHT LESSER SAPH COMPRESS: NORMAL
BH CV LOWER VASCULAR RIGHT LESSER SAPH THROMBUS: NORMAL
BH CV LOWER VASCULAR RIGHT MID FEMORAL AUGMENT: NORMAL
BH CV LOWER VASCULAR RIGHT MID FEMORAL COMPETENT: NORMAL
BH CV LOWER VASCULAR RIGHT MID FEMORAL COMPRESS: NORMAL
BH CV LOWER VASCULAR RIGHT MID FEMORAL PHASIC: NORMAL
BH CV LOWER VASCULAR RIGHT MID FEMORAL SPONT: NORMAL
BH CV LOWER VASCULAR RIGHT PERONEAL COMPRESS: NORMAL
BH CV LOWER VASCULAR RIGHT POPLITEAL AUGMENT: NORMAL
BH CV LOWER VASCULAR RIGHT POPLITEAL COMPETENT: NORMAL
BH CV LOWER VASCULAR RIGHT POPLITEAL COMPRESS: NORMAL
BH CV LOWER VASCULAR RIGHT POPLITEAL PHASIC: NORMAL
BH CV LOWER VASCULAR RIGHT POPLITEAL SPONT: NORMAL
BH CV LOWER VASCULAR RIGHT POSTERIOR TIBIAL COMPRESS: NORMAL
BH CV LOWER VASCULAR RIGHT POSTERIOR TIBIAL THROMBUS: NORMAL
BH CV LOWER VASCULAR RIGHT PROFUNDA FEMORAL COMPRESS: NORMAL
BH CV LOWER VASCULAR RIGHT PROXIMAL FEMORAL COMPRESS: NORMAL
BH CV LOWER VASCULAR RIGHT SAPHENOFEMORAL JUNCTION COMPRESS: NORMAL

## 2020-06-23 PROCEDURE — 93971 EXTREMITY STUDY: CPT

## 2020-06-23 NOTE — TELEPHONE ENCOUNTER
Called patient to start his appointment with us, he stated Dr. Schwartz has already gave him his results and has called in medication and compression socks. He has a call set up at 3:30pm today with Dr. Schwartz.   He wants to know if a referral needs to be put in to see Dr. Schwartz so insurance will cover the visit.  Please advise, thank you.

## 2020-06-23 NOTE — PROGRESS NOTES
Right leg venous doppler completed. Positive for acute calf DVT and superficial vein thrombosis. Results called to Dr. White at 11:10.

## 2020-06-24 ENCOUNTER — TRANSCRIBE ORDERS (OUTPATIENT)
Dept: ADMINISTRATIVE | Facility: HOSPITAL | Age: 60
End: 2020-06-24

## 2020-06-24 DIAGNOSIS — Z85.9 HISTORY OF CANCER: ICD-10-CM

## 2020-06-24 DIAGNOSIS — Z29.9 DVT PROPHYLAXIS: Primary | ICD-10-CM

## 2020-07-06 ENCOUNTER — HOSPITAL ENCOUNTER (OUTPATIENT)
Dept: CT IMAGING | Facility: HOSPITAL | Age: 60
Discharge: HOME | End: 2020-07-06
Admitting: SURGERY

## 2020-07-06 DIAGNOSIS — Z85.9 HISTORY OF CANCER: ICD-10-CM

## 2020-07-06 DIAGNOSIS — Z29.9 DVT PROPHYLAXIS: ICD-10-CM

## 2020-07-06 LAB — CREAT BLDA-MCNC: 1 MG/DL (ref 0.6–1.3)

## 2020-07-06 PROCEDURE — 0 DIATRIZOATE MEGLUMINE & SODIUM PER 1 ML: Performed by: SURGERY

## 2020-07-06 PROCEDURE — 82565 ASSAY OF CREATININE: CPT

## 2020-07-06 PROCEDURE — 25010000002 IOPAMIDOL 61 % SOLUTION: Performed by: SURGERY

## 2020-07-06 PROCEDURE — 74177 CT ABD & PELVIS W/CONTRAST: CPT

## 2020-07-06 PROCEDURE — 71260 CT THORAX DX C+: CPT

## 2020-07-06 RX ADMIN — DIATRIZOATE MEGLUMINE AND DIATRIZOATE SODIUM 30 ML: 600; 100 SOLUTION ORAL; RECTAL at 16:01

## 2020-07-06 RX ADMIN — IOPAMIDOL 85 ML: 612 INJECTION, SOLUTION INTRAVENOUS at 16:01

## 2020-07-22 ENCOUNTER — TRANSCRIBE ORDERS (OUTPATIENT)
Dept: ADMINISTRATIVE | Facility: HOSPITAL | Age: 60
End: 2020-07-22

## 2020-07-22 DIAGNOSIS — K76.89 OTHER SPECIFIED DISEASES OF LIVER: Primary | ICD-10-CM

## 2020-07-29 ENCOUNTER — TELEPHONE (OUTPATIENT)
Dept: GASTROENTEROLOGY | Facility: CLINIC | Age: 60
End: 2020-07-29

## 2020-08-03 ENCOUNTER — HOSPITAL ENCOUNTER (OUTPATIENT)
Dept: MRI IMAGING | Facility: HOSPITAL | Age: 60
Discharge: HOME | End: 2020-08-03
Admitting: SURGERY

## 2020-08-03 DIAGNOSIS — K76.89 OTHER SPECIFIED DISEASES OF LIVER: ICD-10-CM

## 2020-08-03 PROCEDURE — A9575 INJ GADOTERATE MEGLUMI 0.1ML: HCPCS | Performed by: SURGERY

## 2020-08-03 PROCEDURE — 74183 MRI ABD W/O CNTR FLWD CNTR: CPT

## 2020-08-03 PROCEDURE — 25010000002 GADOTERATE MEGLUMINE 10 MMOL/20ML SOLUTION: Performed by: SURGERY

## 2020-08-03 RX ORDER — GADOTERATE MEGLUMINE 376.9 MG/ML
16 INJECTION INTRAVENOUS
Status: COMPLETED | OUTPATIENT
Start: 2020-08-03 | End: 2020-08-03

## 2020-08-03 RX ADMIN — GADOTERATE MEGLUMINE 16 ML: 376.9 INJECTION, SOLUTION INTRAVENOUS at 07:14

## 2020-09-23 ENCOUNTER — OFFICE VISIT (OUTPATIENT)
Dept: SPORTS MEDICINE | Facility: CLINIC | Age: 60
End: 2020-09-23

## 2020-09-23 ENCOUNTER — PREP FOR SURGERY (OUTPATIENT)
Dept: OTHER | Facility: HOSPITAL | Age: 60
End: 2020-09-23

## 2020-09-23 VITALS
WEIGHT: 184.4 LBS | HEART RATE: 56 BPM | DIASTOLIC BLOOD PRESSURE: 70 MMHG | OXYGEN SATURATION: 98 % | RESPIRATION RATE: 15 BRPM | BODY MASS INDEX: 26.4 KG/M2 | TEMPERATURE: 97.7 F | HEIGHT: 70 IN | SYSTOLIC BLOOD PRESSURE: 108 MMHG

## 2020-09-23 DIAGNOSIS — Z23 IMMUNIZATION DUE: ICD-10-CM

## 2020-09-23 DIAGNOSIS — I82.461 ACUTE DEEP VEIN THROMBOSIS (DVT) OF CALF MUSCLE VEIN OF RIGHT LOWER EXTREMITY (HCC): Primary | ICD-10-CM

## 2020-09-23 DIAGNOSIS — Z80.0 FH: COLON CANCER: Primary | ICD-10-CM

## 2020-09-23 DIAGNOSIS — D18.03 HEMANGIOMA OF LIVER: ICD-10-CM

## 2020-09-23 PROCEDURE — 99213 OFFICE O/P EST LOW 20 MIN: CPT | Performed by: FAMILY MEDICINE

## 2020-09-23 RX ORDER — ZOSTER VACCINE RECOMBINANT, ADJUVANTED 50 MCG/0.5
50 KIT INTRAMUSCULAR ONCE
Qty: 1 EACH | Refills: 0 | Status: SHIPPED | OUTPATIENT
Start: 2020-09-23 | End: 2020-09-23

## 2020-09-23 NOTE — PROGRESS NOTES
"Yevgeniy is a 59 y.o. year old male    Chief Complaint   Patient presents with   • Leg Pain     right lower leg pain-follow up-3 months blood clots-resolved-had ultrasound-no blood clots found       History of Present Illness  1. RLE DVT. Was on Eliquis for 6 wks approx. Was managed by vas surgery. States he had repeat US - not available for my review - which was \"in good shape.\" No longer on anticoag and released by vasc surgeon.  2. New finding on MR abdomen of hemangioma liver.  This was done after CT was done out of abundance of caution due to maternal history of cancer.  Mother had blood clots prior to diagnosis of cancer and vascular surgeon requested to order CT which triggered MRI.  3. Will plan to get shingles shot. Also planning to get cscope later this yr. Last one 2012 approx, ? Polyps - no report to review.    I have reviewed the patient's medical, family, and social history in detail and updated the computerized patient record.    Review of Systems  Constitutional: Negative for fever.   Musculoskeletal:        Per HPI   Skin: Negative for rash.   Neurological: Negative for weakness and numbness.   Psychiatric/Behavioral: Negative for sleep disturbance.   All other systems reviewed and are negative.    /70 (BP Location: Right arm, Patient Position: Sitting, Cuff Size: Large Adult)   Pulse 56   Temp 97.7 °F (36.5 °C) (Temporal)   Resp 15   Ht 177.8 cm (70\")   Wt 83.6 kg (184 lb 6.4 oz)   SpO2 98%   BMI 26.46 kg/m²      Physical Exam    Vital signs reviewed.   General: No acute distress.  Eyes: conjunctiva clear; pupils equally round and reactive  ENT: external ears and nose atraumatic; oropharynx clear  CV: no peripheral edema, 2+ distal pulses  Resp: normal respiratory effort, no use of accessory muscles  Skin: no rashes or wounds; normal turgor  Psych: mood and affect appropriate; recent and remote memory intact  Neuro: sensation to light touch intact    MSK Exam:  RLE: - [place]. No " swelling.    Review of previous CT, MRI done in July and August 2020 respectively.  Benign hemangioma seen at the liver with MRI.    Diagnoses and all orders for this visit:    Acute deep vein thrombosis (DVT) of calf muscle vein of right lower extremity (CMS/HCC)    Hemangioma of liver    Immunization due  -     Zoster Vac Recomb Adjuvanted (Shingrix) 50 MCG/0.5ML reconstituted suspension; Inject 0.5 mL into the appropriate muscle as directed by prescriber 1 (One) Time for 1 dose.      1. Will review records after faxed over.  This has been managed by vascular surgeon who has signed off.  We did discuss resuming daily aspirin 81 mg for prophylaxis.  Easy bruising with daily use of this in the past and may consider every other day.  2.  Reviewed CT, MRI as above.  3.  Prescription given.    EMR Dragon/Transcription disclaimer:    Much of this encounter note is an electronic transcription/translation of spoken language to printed text.  The electronic translation of spoken language may permit erroneous, or at times, nonsensical words or phrases to be inadvertently transcribed.  Although I have reviewed the note for such errors some may still exist.

## 2020-10-08 PROBLEM — Z80.0 FH: COLON CANCER: Status: ACTIVE | Noted: 2020-10-08

## 2020-11-10 ENCOUNTER — TRANSCRIBE ORDERS (OUTPATIENT)
Dept: SLEEP MEDICINE | Facility: HOSPITAL | Age: 60
End: 2020-11-10

## 2020-11-10 DIAGNOSIS — Z01.818 OTHER SPECIFIED PRE-OPERATIVE EXAMINATION: Primary | ICD-10-CM

## 2020-12-03 ENCOUNTER — ANESTHESIA EVENT (OUTPATIENT)
Dept: GASTROENTEROLOGY | Facility: HOSPITAL | Age: 60
End: 2020-12-03

## 2020-12-04 ENCOUNTER — LAB (OUTPATIENT)
Dept: LAB | Facility: HOSPITAL | Age: 60
End: 2020-12-04

## 2020-12-04 DIAGNOSIS — Z01.818 OTHER SPECIFIED PRE-OPERATIVE EXAMINATION: ICD-10-CM

## 2020-12-04 PROCEDURE — C9803 HOPD COVID-19 SPEC COLLECT: HCPCS

## 2020-12-04 PROCEDURE — U0004 COV-19 TEST NON-CDC HGH THRU: HCPCS

## 2020-12-05 LAB — SARS-COV-2 RNA RESP QL NAA+PROBE: NOT DETECTED

## 2020-12-07 ENCOUNTER — HOSPITAL ENCOUNTER (OUTPATIENT)
Facility: HOSPITAL | Age: 60
Setting detail: HOSPITAL OUTPATIENT SURGERY
Discharge: HOME | End: 2020-12-07
Attending: INTERNAL MEDICINE | Admitting: INTERNAL MEDICINE

## 2020-12-07 ENCOUNTER — ANESTHESIA (OUTPATIENT)
Dept: GASTROENTEROLOGY | Facility: HOSPITAL | Age: 60
End: 2020-12-07

## 2020-12-07 VITALS
TEMPERATURE: 97.9 F | RESPIRATION RATE: 16 BRPM | OXYGEN SATURATION: 98 % | DIASTOLIC BLOOD PRESSURE: 64 MMHG | BODY MASS INDEX: 26.05 KG/M2 | WEIGHT: 182 LBS | SYSTOLIC BLOOD PRESSURE: 124 MMHG | HEIGHT: 70 IN | HEART RATE: 66 BPM

## 2020-12-07 PROCEDURE — 45378 DIAGNOSTIC COLONOSCOPY: CPT | Performed by: INTERNAL MEDICINE

## 2020-12-07 PROCEDURE — 25010000002 PROPOFOL 10 MG/ML EMULSION: Performed by: ANESTHESIOLOGY

## 2020-12-07 RX ORDER — LIDOCAINE HYDROCHLORIDE 20 MG/ML
INJECTION, SOLUTION INFILTRATION; PERINEURAL AS NEEDED
Status: DISCONTINUED | OUTPATIENT
Start: 2020-12-07 | End: 2020-12-07 | Stop reason: SURG

## 2020-12-07 RX ORDER — PROPOFOL 10 MG/ML
VIAL (ML) INTRAVENOUS CONTINUOUS PRN
Status: DISCONTINUED | OUTPATIENT
Start: 2020-12-07 | End: 2020-12-07 | Stop reason: SURG

## 2020-12-07 RX ORDER — PROPOFOL 10 MG/ML
VIAL (ML) INTRAVENOUS AS NEEDED
Status: DISCONTINUED | OUTPATIENT
Start: 2020-12-07 | End: 2020-12-07 | Stop reason: SURG

## 2020-12-07 RX ORDER — SODIUM CHLORIDE, SODIUM LACTATE, POTASSIUM CHLORIDE, CALCIUM CHLORIDE 600; 310; 30; 20 MG/100ML; MG/100ML; MG/100ML; MG/100ML
1000 INJECTION, SOLUTION INTRAVENOUS CONTINUOUS
Status: DISCONTINUED | OUTPATIENT
Start: 2020-12-07 | End: 2020-12-07 | Stop reason: HOSPADM

## 2020-12-07 RX ADMIN — LIDOCAINE HYDROCHLORIDE 80 MG: 20 INJECTION, SOLUTION INFILTRATION; PERINEURAL at 08:42

## 2020-12-07 RX ADMIN — PROPOFOL 100 MG: 10 INJECTION, EMULSION INTRAVENOUS at 08:42

## 2020-12-07 RX ADMIN — PROPOFOL 50 MG: 10 INJECTION, EMULSION INTRAVENOUS at 08:44

## 2020-12-07 RX ADMIN — SODIUM CHLORIDE, POTASSIUM CHLORIDE, SODIUM LACTATE AND CALCIUM CHLORIDE 1000 ML: 600; 310; 30; 20 INJECTION, SOLUTION INTRAVENOUS at 08:00

## 2020-12-07 RX ADMIN — PROPOFOL 300 MCG/KG/MIN: 10 INJECTION, EMULSION INTRAVENOUS at 08:42

## 2020-12-07 NOTE — ANESTHESIA PREPROCEDURE EVALUATION
Anesthesia Evaluation     Patient summary reviewed and Nursing notes reviewed   no history of anesthetic complications:  NPO Solid Status: > 8 hours  NPO Liquid Status: > 4 hours           Airway   Mallampati: II  Dental - normal exam     Pulmonary - negative pulmonary ROS and normal exam   Cardiovascular - negative cardio ROS and normal exam        Neuro/Psych- negative ROS  GI/Hepatic/Renal/Endo - negative ROS     Musculoskeletal     Abdominal    Substance History      OB/GYN          Other                        Anesthesia Plan    ASA 1     MAC     intravenous induction     Anesthetic plan, all risks, benefits, and alternatives have been provided, discussed and informed consent has been obtained with: patient.

## 2020-12-07 NOTE — BRIEF OP NOTE
COLONOSCOPY  Progress Note    Yevgeniy Rodriguez  12/7/2020    Pre-op Diagnosis:   FH: colon cancer [Z80.0]       Post-Op Diagnosis Codes:     * FH: colon cancer [Z80.0]     * Diverticulosis [K57.90]     * Internal hemorrhoids [K64.8]    Procedure/CPT® Codes:        Procedure(s):  COLONOSCOPY TO CECUM AND TERMINAL ILEUM    Surgeon(s):  Uche Drew MD    Anesthesia: Monitored Anesthesia Care    Staff:   Endo Technician: Danni Rubio  Endo Nurse: Jenni Tomlinson RN         Estimated Blood Loss: none    Urine Voided: * No values recorded between 12/7/2020  8:37 AM and 12/7/2020  9:02 AM *    Specimens:                None          Drains: * No LDAs found *    Findings: Colonoscopy to the terminal ileum with normal ileal mucosa.  Sigmoid diverticulosis and internal hemorrhoids were identified.    Complications: None          Uche Drew MD     Date: 12/7/2020  Time: 09:04 EST

## 2020-12-07 NOTE — DISCHARGE INSTRUCTIONS
Colonoscopy, Adult, Care After  This sheet gives you information about how to care for yourself after your procedure. Your health care provider may also give you more specific instructions. If you have problems or questions, contact your health care provider.  What can I expect after the procedure?  After the procedure, it is common to have:  · A small amount of blood in your stool for 24 hours after the procedure.  · Some gas.  · Mild cramping or bloating of your abdomen.  Follow these instructions at home:  Eating and drinking    · Drink enough fluid to keep your urine pale yellow.  · Follow instructions from your health care provider about eating or drinking restrictions.  · Resume your normal diet as instructed by your health care provider. Avoid heavy or fried foods that are hard to digest.  Activity  · Rest as told by your health care provider.  · Avoid sitting for a long time without moving. Get up to take short walks every 1-2 hours. This is important to improve blood flow and breathing. Ask for help if you feel weak or unsteady.  · Return to your normal activities as told by your health care provider. Ask your health care provider what activities are safe for you.  Managing cramping and bloating    · Try walking around when you have cramps or feel bloated.  · Apply heat to your abdomen as told by your health care provider. Use the heat source that your health care provider recommends, such as a moist heat pack or a heating pad.  ? Place a towel between your skin and the heat source.  ? Leave the heat on for 20-30 minutes.  ? Remove the heat if your skin turns bright red. This is especially important if you are unable to feel pain, heat, or cold. You may have a greater risk of getting burned.  General instructions  · For the first 24 hours after the procedure:  ? Do not drive or use machinery.  ? Do not sign important documents.  ? Do not drink alcohol.  ? Do your regular daily activities at a slower pace  than normal.  ? Eat soft foods that are easy to digest.  · Take over-the-counter and prescription medicines only as told by your health care provider.  · Keep all follow-up visits as told by your health care provider. This is important.  Contact a health care provider if:  · You have blood in your stool 2-3 days after the procedure.  Get help right away if you have:  · More than a small spotting of blood in your stool.  · Large blood clots in your stool.  · Swelling of your abdomen.  · Nausea or vomiting.  · A fever.  · Increasing pain in your abdomen that is not relieved with medicine.  Summary  · After the procedure, it is common to have a small amount of blood in your stool. You may also have mild cramping and bloating of your abdomen.  · For the first 24 hours after the procedure, do not drive or use machinery, sign important documents, or drink alcohol.  · Get help right away if you have a lot of blood in your stool, nausea or vomiting, a fever, or increased pain in your abdomen.  This information is not intended to replace advice given to you by your health care provider. Make sure you discuss any questions you have with your health care provider.  Document Revised: 07/13/2020 Document Reviewed: 07/13/2020  Elsevier Patient Education © 2020 [employer]

## 2020-12-07 NOTE — H&P
"[facility]  Pre Procedure History & Physical    Chief Complaint:   Family history of colon cancer    Subjective     HPI:   Patient 60-year-old male with history of seasonal allergies presenting for screening.  Patient last colonoscopy  reports mother diagnosed with history colon cancer.  Patient here for colonoscopy.    Past Medical History:   Past Medical History:   Diagnosis Date   • Allergic Years ago   • Erectile dysfunction 2 years ago   • Seasonal allergies    • Shoulder injury surgery years ago       Past Surgical History:  Past Surgical History:   Procedure Laterality Date   • COLONOSCOPY     • EYE SURGERY      laser surgery   • SHOULDER SURGERY Bilateral     exploratory arthroscopy   • VASECTOMY      Years ago   • WISDOM TOOTH EXTRACTION         Family History:  Family History   Problem Relation Age of Onset   • Liver cancer Mother    • Cancer Mother                 Social History:   reports that he has never smoked. He has never used smokeless tobacco. He reports current alcohol use. He reports that he does not use drugs.    Medications:   Medications Prior to Admission   Medication Sig Dispense Refill Last Dose   • cetirizine (zyrTEC) 10 MG tablet Take 10 mg by mouth Daily.   2020   • NP THYROID 30 MG tablet Take 30 mg by mouth Daily.   2020       Allergies:  Patient has no known allergies.    ROS:    Pertinent items are noted in HPI     Objective     Blood pressure 129/77, pulse 70, resp. rate 15, height 177.8 cm (70\"), weight 82.6 kg (182 lb), SpO2 96 %.    Physical Exam   Constitutional: Pt is oriented to person, place, and time and well-developed, well-nourished, and in no distress.   Mouth/Throat: Oropharynx is clear and moist.   Neck: Normal range of motion.   Cardiovascular: Normal rate, regular rhythm and normal heart sounds.    Pulmonary/Chest: Effort normal and breath sounds normal.   Abdominal: Soft. Nontender  Skin: Skin is warm and dry. "   Psychiatric: Mood, memory, affect and judgment normal.     Assessment/Plan     Diagnosis:  Family history colon cancer    Anticipated Surgical Procedure:  Colonoscopy    The risks, benefits, and alternatives of this procedure have been discussed with the patient or the responsible party- the patient understands and agrees to proceed.

## 2021-03-26 ENCOUNTER — BULK ORDERING (OUTPATIENT)
Dept: CASE MANAGEMENT | Facility: OTHER | Age: 61
End: 2021-03-26

## 2021-03-26 DIAGNOSIS — Z23 IMMUNIZATION DUE: ICD-10-CM

## 2021-04-14 ENCOUNTER — OFFICE VISIT (OUTPATIENT)
Dept: SPORTS MEDICINE | Facility: CLINIC | Age: 61
End: 2021-04-14

## 2021-04-14 VITALS
SYSTOLIC BLOOD PRESSURE: 121 MMHG | TEMPERATURE: 97.3 F | HEART RATE: 61 BPM | DIASTOLIC BLOOD PRESSURE: 80 MMHG | HEIGHT: 70 IN | OXYGEN SATURATION: 99 % | BODY MASS INDEX: 25.77 KG/M2 | WEIGHT: 180 LBS

## 2021-04-14 DIAGNOSIS — Z00.00 ANNUAL PHYSICAL EXAM: Primary | ICD-10-CM

## 2021-04-14 DIAGNOSIS — Z12.5 SCREENING FOR PROSTATE CANCER: ICD-10-CM

## 2021-04-14 DIAGNOSIS — Z86.718 HISTORY OF DEEP VENOUS THROMBOSIS (DVT) OF DISTAL VEIN OF RIGHT LOWER EXTREMITY: ICD-10-CM

## 2021-04-14 PROBLEM — Z80.0 FH: COLON CANCER: Status: RESOLVED | Noted: 2020-10-08 | Resolved: 2021-04-14

## 2021-04-14 PROCEDURE — 99396 PREV VISIT EST AGE 40-64: CPT | Performed by: FAMILY MEDICINE

## 2021-04-14 RX ORDER — LEVOTHYROXINE, LIOTHYRONINE 9.5; 2.25 UG/1; UG/1
TABLET ORAL
COMMUNITY
Start: 2021-03-23 | End: 2022-10-12

## 2021-04-14 NOTE — PROGRESS NOTES
"Yevgeniy Rodriguez is here today for an annual physical exam.     Eating a healthy diet. Exercising routinely.     Paternal h/o borderline HLD, no rx. Lived til 89.  RLE - no issues. Still takes aspirin 81 mg on occasion. Will take Eliquis prior to long car trips.    I have reviewed the patient's medical, family, and social history in detail and updated the computerized patient record.    Health Maintenance   Topic Date Due   • ZOSTER VACCINE (1 of 2) Never done   • INFLUENZA VACCINE  08/01/2021   • ANNUAL PHYSICAL  04/15/2022   • COLONOSCOPY  12/07/2025   • TDAP/TD VACCINES (2 - Td) 11/15/2027   • HEPATITIS C SCREENING  Completed   • COVID-19 Vaccine  Completed   • Pneumococcal Vaccine 0-64  Aged Out       PHQ-2 Depression Screening  Little interest or pleasure in doing things? 0   Feeling down, depressed, or hopeless? 0   PHQ-2 Total Score 0     /80 (BP Location: Left arm, Patient Position: Sitting, Cuff Size: Adult)   Pulse 61   Temp 97.3 °F (36.3 °C)   Ht 177.8 cm (70\")   Wt 81.6 kg (180 lb)   SpO2 99%   BMI 25.83 kg/m²      Physical Exam    Mask worn throughout encounter  Vital signs reviewed.  General appearance: No acute distress  Eyes: conjunctiva clear without erythema; pupils equally round and reactive  ENT: external ears and nose normal; hearing normal   Neck: supple; no thyromegaly  CV: normal rate and rhythm; no peripheral edema  Respiratory: normal respiratory effort; lungs clear to auscultation bilaterally  GI: Bowel sounds x4, soft, nontender  MSK: normal gait and station; no focal joint deformity or swelling  Skin: no rash or wounds; normal turgor  Neuro: cranial nerves 2-12 grossly intact; normal sensation to light touch  Psych: mood and affect normal; recent and remote memory intact    No visits with results within 2 Week(s) from this visit.   Latest known visit with results is:   Lab on 12/04/2020   Component Date Value Ref Range Status   • SARS-CoV-2 PCR 12/04/2020 Not Detected  Not " Detected Final    Nucleic acid specific to SARS-CoV-2 (COVID-19) virus was not detected in  this sample by the TaqPath (TM) COVID-19 Combo Kit.          SARS-CoV-2 (COVID-19) nucleic acid testing performed using [employer] Aptima (R) SARS-CoV-2 Assay or [employer] TaqPath (TM)  COVID-19 Combo Kit as indicated above under Emergency Use Authorization (EUA) from the FDA. Aptima (R) and TaqPath (TM) test performance  characteristics verified by [facility] in accordance with the FDAs Guidance Document (Policy for Diagnostic Tests for Coronavirus Disease-2019  during the Public Health Emergency) issued on March 16, 2020. The laboratory is regulated under CLIA as qualified to perform high-complexity testing  Unless otherwise noted, all testing was performed at [facility], CLIA No. [id number], [place] State Licensee No. [id number]        Diagnoses and all orders for this visit:    1. Annual physical exam (Primary)  -     CBC & Differential  -     Comprehensive Metabolic Panel  -     Lipid Panel    2. History of deep venous thrombosis (DVT) of distal vein of right lower extremity    3. Screening for prostate cancer  -     PSA Screen      Can cont qod aspirin, use on long trips.    Encourage healthy diet and exercise.  Encourage patient to stay up to date on screening examinations as indicated based on age and risk factors.    EMR Dragon/Transcription disclaimer:    Much of this encounter note is an electronic transcription/translation of spoken language to printed text.  The electronic translation of spoken language may permit erroneous, or at times, nonsensical words or phrases to be inadvertently transcribed.  Although I have reviewed the note for such errors some may still exist.

## 2021-04-15 LAB
ALBUMIN SERPL-MCNC: 4.6 G/DL (ref 3.5–5.2)
ALBUMIN/GLOB SERPL: 2 G/DL
ALP SERPL-CCNC: 71 U/L (ref 39–117)
ALT SERPL-CCNC: 21 U/L (ref 1–41)
AST SERPL-CCNC: 27 U/L (ref 1–40)
BASOPHILS # BLD AUTO: 0.04 10*3/MM3 (ref 0–0.2)
BASOPHILS NFR BLD AUTO: 0.8 % (ref 0–1.5)
BILIRUB SERPL-MCNC: 0.5 MG/DL (ref 0–1.2)
BUN SERPL-MCNC: 18 MG/DL (ref 6–20)
BUN/CREAT SERPL: 17.1 (ref 7–25)
CALCIUM SERPL-MCNC: 9.5 MG/DL (ref 8.6–10.5)
CHLORIDE SERPL-SCNC: 104 MMOL/L (ref 98–107)
CHOLEST SERPL-MCNC: 198 MG/DL (ref 0–200)
CO2 SERPL-SCNC: 29.3 MMOL/L (ref 22–29)
CREAT SERPL-MCNC: 1.05 MG/DL (ref 0.76–1.27)
EOSINOPHIL # BLD AUTO: 0.07 10*3/MM3 (ref 0–0.4)
EOSINOPHIL NFR BLD AUTO: 1.4 % (ref 0.3–6.2)
ERYTHROCYTE [DISTWIDTH] IN BLOOD BY AUTOMATED COUNT: 12.9 % (ref 12.3–15.4)
GLOBULIN SER CALC-MCNC: 2.3 GM/DL
GLUCOSE SERPL-MCNC: 91 MG/DL (ref 65–99)
HCT VFR BLD AUTO: 45.2 % (ref 37.5–51)
HDLC SERPL-MCNC: 60 MG/DL (ref 40–60)
HGB BLD-MCNC: 15.2 G/DL (ref 13–17.7)
IMM GRANULOCYTES # BLD AUTO: 0 10*3/MM3 (ref 0–0.05)
IMM GRANULOCYTES NFR BLD AUTO: 0 % (ref 0–0.5)
LDLC SERPL CALC-MCNC: 116 MG/DL (ref 0–100)
LYMPHOCYTES # BLD AUTO: 1.83 10*3/MM3 (ref 0.7–3.1)
LYMPHOCYTES NFR BLD AUTO: 37.9 % (ref 19.6–45.3)
MCH RBC QN AUTO: 31.7 PG (ref 26.6–33)
MCHC RBC AUTO-ENTMCNC: 33.6 G/DL (ref 31.5–35.7)
MCV RBC AUTO: 94.4 FL (ref 79–97)
MONOCYTES # BLD AUTO: 0.44 10*3/MM3 (ref 0.1–0.9)
MONOCYTES NFR BLD AUTO: 9.1 % (ref 5–12)
NEUTROPHILS # BLD AUTO: 2.45 10*3/MM3 (ref 1.7–7)
NEUTROPHILS NFR BLD AUTO: 50.8 % (ref 42.7–76)
NRBC BLD AUTO-RTO: 0 /100 WBC (ref 0–0.2)
PLATELET # BLD AUTO: 220 10*3/MM3 (ref 140–450)
POTASSIUM SERPL-SCNC: 4.5 MMOL/L (ref 3.5–5.2)
PROT SERPL-MCNC: 6.9 G/DL (ref 6–8.5)
PSA SERPL-MCNC: 1.28 NG/ML (ref 0–4)
RBC # BLD AUTO: 4.79 10*6/MM3 (ref 4.14–5.8)
SODIUM SERPL-SCNC: 140 MMOL/L (ref 136–145)
TRIGL SERPL-MCNC: 122 MG/DL (ref 0–150)
VLDLC SERPL CALC-MCNC: 22 MG/DL (ref 5–40)
WBC # BLD AUTO: 4.83 10*3/MM3 (ref 3.4–10.8)

## 2021-04-15 NOTE — PROGRESS NOTES
Called patient and spoke verbally in regards to lab results. Informed him cholesterol panel is worse, can improve with healthy diet and exercise; no medication indicated at this time; and remainder of labs are normal per Dr. White.

## 2021-06-18 ENCOUNTER — LAB (OUTPATIENT)
Dept: LAB | Facility: HOSPITAL | Age: 61
End: 2021-06-18

## 2021-06-18 ENCOUNTER — CONSULT (OUTPATIENT)
Dept: ONCOLOGY | Facility: CLINIC | Age: 61
End: 2021-06-18

## 2021-06-18 VITALS
DIASTOLIC BLOOD PRESSURE: 82 MMHG | BODY MASS INDEX: 26.93 KG/M2 | WEIGHT: 188.1 LBS | HEIGHT: 70 IN | HEART RATE: 63 BPM | RESPIRATION RATE: 18 BRPM | TEMPERATURE: 98.2 F | OXYGEN SATURATION: 96 % | SYSTOLIC BLOOD PRESSURE: 130 MMHG

## 2021-06-18 DIAGNOSIS — K76.9 LESION OF LIVER: ICD-10-CM

## 2021-06-18 DIAGNOSIS — I82.412 ACUTE DEEP VEIN THROMBOSIS (DVT) OF FEMORAL VEIN OF LEFT LOWER EXTREMITY (HCC): ICD-10-CM

## 2021-06-18 DIAGNOSIS — D68.59 THROMBOPHILIA (HCC): Primary | ICD-10-CM

## 2021-06-18 DIAGNOSIS — I82.412 ACUTE DEEP VEIN THROMBOSIS (DVT) OF FEMORAL VEIN OF LEFT LOWER EXTREMITY (HCC): Primary | ICD-10-CM

## 2021-06-18 LAB
AT III PPP CHRO-ACNC: 77 % (ref 90–134)
BASOPHILS # BLD AUTO: 0.03 10*3/MM3 (ref 0–0.2)
BASOPHILS NFR BLD AUTO: 0.6 % (ref 0–1.5)
DEPRECATED RDW RBC AUTO: 43.9 FL (ref 37–54)
EOSINOPHIL # BLD AUTO: 0.08 10*3/MM3 (ref 0–0.4)
EOSINOPHIL NFR BLD AUTO: 1.7 % (ref 0.3–6.2)
ERYTHROCYTE [DISTWIDTH] IN BLOOD BY AUTOMATED COUNT: 13.2 % (ref 12.3–15.4)
HCT VFR BLD AUTO: 41.4 % (ref 37.5–51)
HGB BLD-MCNC: 14.2 G/DL (ref 13–17.7)
IMM GRANULOCYTES # BLD AUTO: 0.02 10*3/MM3 (ref 0–0.05)
IMM GRANULOCYTES NFR BLD AUTO: 0.4 % (ref 0–0.5)
LYMPHOCYTES # BLD AUTO: 1.82 10*3/MM3 (ref 0.7–3.1)
LYMPHOCYTES NFR BLD AUTO: 39.4 % (ref 19.6–45.3)
MCH RBC QN AUTO: 31 PG (ref 26.6–33)
MCHC RBC AUTO-ENTMCNC: 34.3 G/DL (ref 31.5–35.7)
MCV RBC AUTO: 90.4 FL (ref 79–97)
MONOCYTES # BLD AUTO: 0.46 10*3/MM3 (ref 0.1–0.9)
MONOCYTES NFR BLD AUTO: 10 % (ref 5–12)
NEUTROPHILS NFR BLD AUTO: 2.21 10*3/MM3 (ref 1.7–7)
NEUTROPHILS NFR BLD AUTO: 47.9 % (ref 42.7–76)
NRBC BLD AUTO-RTO: 0 /100 WBC (ref 0–0.2)
PLATELET # BLD AUTO: 181 10*3/MM3 (ref 140–450)
PMV BLD AUTO: 10.3 FL (ref 6–12)
PROT C ACT/NOR PPP: 78 % (ref 86–163)
PROT S FREE PPP-ACNC: 116 % (ref 49–138)
RBC # BLD AUTO: 4.58 10*6/MM3 (ref 4.14–5.8)
WBC # BLD AUTO: 4.62 10*3/MM3 (ref 3.4–10.8)

## 2021-06-18 PROCEDURE — 85025 COMPLETE CBC W/AUTO DIFF WBC: CPT

## 2021-06-18 PROCEDURE — 85306 CLOT INHIBIT PROT S FREE: CPT | Performed by: INTERNAL MEDICINE

## 2021-06-18 PROCEDURE — 36415 COLL VENOUS BLD VENIPUNCTURE: CPT

## 2021-06-18 PROCEDURE — 85300 ANTITHROMBIN III ACTIVITY: CPT

## 2021-06-18 PROCEDURE — 81240 F2 GENE: CPT

## 2021-06-18 PROCEDURE — 81241 F5 GENE: CPT | Performed by: INTERNAL MEDICINE

## 2021-06-18 PROCEDURE — 99244 OFF/OP CNSLTJ NEW/EST MOD 40: CPT | Performed by: INTERNAL MEDICINE

## 2021-06-18 PROCEDURE — 85306 CLOT INHIBIT PROT S FREE: CPT

## 2021-06-18 PROCEDURE — 85303 CLOT INHIBIT PROT C ACTIVITY: CPT

## 2021-06-18 RX ORDER — APIXABAN 5 MG/1
TABLET, FILM COATED ORAL
COMMUNITY
Start: 2021-06-10 | End: 2021-10-15

## 2021-06-18 NOTE — PROGRESS NOTES
.     REASON FOR CONSULTATION:     Provide an opinion on any further workup or treatment of recurrent lower extremity DVT.                             REQUESTING PHYSICIAN: Alf Schwartz MD     RECORDS OBTAINED:  Records of the patient's history including those obtained from the referring provider were reviewed and summarized in detail.    HISTORY OF PRESENT ILLNESS:  The patient is a 60 y.o. year old male with seasonal allergies but otherwise no chronic medical problem who presented today for recurrent acute DVT. He is referred by Vascular Surgeon, Alf Schwartz MD.     This patient had 1st episode of left calf vein DVT in 06/2020, for which he was given Eliquis for about 6 weeks according to patient himself. He had long distance traveling prior to that episode of DVT, from [place] to [place], with no stopping and resting on the way.     This time the patient had long distance travel again, however he was stopping frequently and drinking plenty of water, nevertheless he complained of pain and palpable venous cord on his medial side of his left thigh and he noted left calf pain earlier in 05/2021 when he was down in [place]. He actually went to a local emergency room, with venous Doppler study negative. After he returned to [place], patient had a venous Doppler study on 5/6/2021 reported no evidence for lower extremity DVT in the left leg.  However patient complains worsening of tightness and redness in the left thigh. When he presented to Dr. Schwartz's office on 05/28/2021 for reevaluation there was a palpable red cord in the left thigh. It was tender to palpation. The patient had repeat venous Doppler study on 05/28/2021 which reported acute left common femoral vein DVT, there was also acute thrombophlebitis in the left great saphenous vein. The right leg is negative for any venous thrombosis.     The patient was started on Eliquis 5 mg for anticoagulation.     Today the patient reports that his  left thigh tenderness has much improved. The venous cord is also improved, and barely palpable. He reports no chest pain or dyspnea.     This patient has history of abnormal CT of the abdomen obtained on 07/06/2020 which reported liver lesions, and up to 4.8 cm. The patient subsequently had abdomen MRI examination which reported possible hepatic hemangioma. The patient also had evidence of renal cyst.     This patient has no significant history of cigarette smoking. He has no family history for thrombosis.        Past Medical History:   Diagnosis Date   • Acute deep vein thrombosis (DVT) of femoral vein of left lower extremity (CMS/HCC) 6/18/2021   • Allergic Years ago   • Deep vein thrombosis (DVT) (CMS/HCC)     RLE   • Erectile dysfunction 2 years ago   • Hemangioma of liver    • Seasonal allergies    • Shoulder injury surgery years ago     Past Surgical History:   Procedure Laterality Date   • COLONOSCOPY  2011   • COLONOSCOPY N/A 12/7/2020    Procedure: COLONOSCOPY TO CECUM AND TERMINAL ILEUM;  Surgeon: Uche Drew MD;  Location: [facility] ENDOSCOPY;  Service: Gastroenterology;  Laterality: N/A;  PRE- FAMILY HISTORY OF COLON CANCER  POST- DIVERTICULOSIS, HEMORRHOIDS   • EYE SURGERY      laser surgery   • SHOULDER SURGERY Bilateral 2011    exploratory arthroscopy   • VASECTOMY      Years ago   • WISDOM TOOTH EXTRACTION         MEDICATIONS    Current Outpatient Medications:   •  cetirizine (zyrTEC) 10 MG tablet, Take 10 mg by mouth Daily., Disp: , Rfl:   •  Eliquis 5 MG tablet tablet, , Disp: , Rfl:   •  NP Thyroid 15 MG tablet, , Disp: , Rfl:     ALLERGIES:   No Known Allergies    SOCIAL HISTORY:       Social History     Socioeconomic History   • Marital status:      Spouse name: Rupa   • Number of children: Not on file   • Years of education: Not on file   • Highest education level: Not on file   Tobacco Use   • Smoking status: Never Smoker   • Smokeless tobacco: Never Used   Vaping Use   • Vaping  "Use: Never used   Substance and Sexual Activity   • Alcohol use: Yes     Types: 2 Glasses of wine per week     Comment: social   • Drug use: No   • Sexual activity: Yes     Partners: Female         FAMILY HISTORY:  Family History   Problem Relation Age of Onset   • Liver cancer Mother    • Cancer Mother             • Other Mother         Blood clots   · No family history of thrombophilia.     REVIEW OF SYSTEMS:  Review of Systems   Constitutional: Positive for fatigue (Start recently for 3 to 4 weeks as reported on 2021). Negative for activity change, appetite change and unexpected weight change.   HENT: Negative for nosebleeds and sore throat.    Eyes: Negative for visual disturbance.   Respiratory: Negative for cough, shortness of breath and wheezing.    Cardiovascular: Positive for leg swelling (Left thigh swelling this has improved). Negative for chest pain and palpitations.   Gastrointestinal: Negative for abdominal pain, blood in stool and nausea.   Endocrine: Negative for cold intolerance.   Genitourinary: Negative for dysuria and hematuria.   Musculoskeletal: Negative for arthralgias and joint swelling.   Skin: Negative for color change and pallor.   Allergic/Immunologic: Positive for environmental allergies. Negative for immunocompromised state.   Neurological: Negative for dizziness and headaches.   Hematological: Negative for adenopathy. Does not bruise/bleed easily.   Psychiatric/Behavioral: Negative for agitation and sleep disturbance.              Vitals:    21 0826   BP: 130/82   Pulse: 63   Resp: 18   Temp: 98.2 °F (36.8 °C)   TempSrc: Temporal   SpO2: 96%   Weight: 85.3 kg (188 lb 1.6 oz)   Height: 177.8 cm (70\")  Comment: new patient height   PainSc: 0-No pain     Current Status 2021   ECOG score 0      PHYSICAL EXAM:      CONSTITUTIONAL:  Vital signs reviewed.  Well-developed well-nourished  male.  No distress, looks comfortable.  EYES:  Conjunctiva and lids " unremarkable.  PERRLA  EARS,NOSE,MOUTH,THROAT:  Patient wears mask due to the pandemic coronavirus infection.   RESPIRATORY:  Normal respiratory effort.  Lungs clear to auscultation bilaterally.  CARDIOVASCULAR: Regular rhythm and rate.  Normal S1, S2.  No murmurs rubs or gallops.  No significant lower extremity edema.  GASTROINTESTINAL: Abdomen appears unremarkable.  Nontender.  No hepatomegaly.  No splenomegaly.  LYMPHATIC:  No cervical, supraclavicular, axillary lymphadenopathy.  MUSCULOSKELETAL:  Unremarkable gait and station.  Unremarkable digits/nails.  No cyanosis or clubbing.  Left thigh has a vaguely palpable venous cord no significant tenderness.  No skin redness.  No calf tenderness.  SKIN:  Warm.  No rashes.  PSYCHIATRIC:  Normal judgment and insight.  Normal mood and affect.      RECENT LABS:        WBC   Date Value Ref Range Status   06/18/2021 4.62 3.40 - 10.80 10*3/mm3 Final   04/14/2021 4.83 3.40 - 10.80 10*3/mm3 Final   04/08/2019 4.0 3.4 - 10.8 x10E3/uL Final   11/22/2017 4.09 (L) 4.50 - 10.70 10*3/mm3 Final   11/25/2014 4.28 (L) 4.50 - 10.70 K/Cumm Final     Hemoglobin   Date Value Ref Range Status   06/18/2021 14.2 13.0 - 17.7 g/dL Final   04/14/2021 15.2 13.0 - 17.7 g/dL Final   04/08/2019 13.8 13.0 - 17.7 g/dL Final   11/22/2017 13.9 13.7 - 17.6 g/dL Final   11/25/2014 14.5 13.7 - 17.6 g/dL Final     Platelets   Date Value Ref Range Status   06/18/2021 181 140 - 450 10*3/mm3 Final   04/14/2021 220 140 - 450 10*3/mm3 Final   04/08/2019 206 150 - 379 x10E3/uL Final   11/22/2017 241 140 - 500 10*3/mm3 Final   11/25/2014 229 140 - 500 K/Cumm Final       Assessment/Plan     ASSESSMENT: Recurrent left leg acute DVT, this time happened with the much significant left common femoral vein acute DVT together with the left greater saphenous vein superficial thrombophlebitis diagnosed on 05/28/2021 after long distance travel, however he was stopping frequently and drinking plenty of water, not as he did  prior to his first left leg calf vein acute DVT in 06/2020. I discussed with the patient today, recommended comprehensive hypercoagulable workup including both congenital and acquired hypercoagulable disorders. We will obtain laboratory studies including factor II mutation, factor V Leiden mutation, protein C activity, protein S activity and free protein S, antithrombin-III, and also studies for antiphospholipid antibodies. I pointed out to the patient that because he already has been on Eliquis, there is possibility of false positive results for antiphospholipid antibodies.  I also recommended to have CT scan for chest, abdomen and pelvis for evaluation, because malignancy could cause significant DVT considering he had liver lesions in the past, and it should be reevaluated. Patient had liver lesions, I certainly want to reevaluate that to see if there is any progression.     PLAN:  1. Laboratory studies as listed.    - Antithrombin III  - Beta-2 Glycoprotein Antibodies  - Cardiolipin Antibody  - Factor 5 Leiden  - Lupus Anticoagulant  - Phosphatidylserine Antibodies  - Protein C Activity  - Protein S Antigen, Free  - Protein S Functional  - Prothrombin gene mutation  - Antiphosphotidyl Antibodies Panel II    2. Obtain CT scan for chest, abdomen and pelvis with contrast.  3. I will bring the patient back for reevaluation in 2 weeks.  4. Patient will continue Eliquis for anticoagulation. This patient needs long term anticoagulation, possible lifelong anticoagulation.     Discussed with the patient and he voiced understanding and agreeable for the recommendations.       JAM HERNANDEZ M.D., Ph.D.    6/18/2021.        CC:   MD Dale García Jr. DO

## 2021-06-19 LAB
CARDIOLIPIN IGA SER IA-ACNC: <9 APL U/ML (ref 0–11)
CARDIOLIPIN IGG SER IA-ACNC: <9 GPL U/ML (ref 0–14)
CARDIOLIPIN IGM SER IA-ACNC: 10 MPL U/ML (ref 0–12)

## 2021-06-20 LAB
B2 GLYCOPROT1 IGA SER-ACNC: <9 GPI IGA UNITS (ref 0–25)
B2 GLYCOPROT1 IGG SER-ACNC: <9 GPI IGG UNITS (ref 0–20)
B2 GLYCOPROT1 IGM SER-ACNC: <9 GPI IGM UNITS (ref 0–32)
FACTOR II, DNA ANALYSIS: NORMAL

## 2021-06-21 LAB
PS IGA SER-ACNC: 1 APS IGA (ref 0–20)
PS IGG SER-ACNC: 2 GPS IGG (ref 0–11)
PS IGM SER-ACNC: 4 MPS IGM (ref 0–25)

## 2021-06-22 LAB
APTT SCREEN TO CONFIRM RATIO: 1.28 RATIO (ref 0–1.4)
CONFIRM APTT/NORMAL: 43.8 SEC (ref 0–55)
DRVVT SCREEN TO CONFIRM RATIO: 1.3 RATIO (ref 0.8–1.2)
LA 2 SCREEN W REFLEX-IMP: ABNORMAL
MIXING DRVVT: 44.7 SEC (ref 0–40.4)
PROT S ACT/NOR PPP: 122 % (ref 70–127)
SCREEN APTT: 34.6 SEC (ref 0–51.9)
SCREEN DRVVT: 59.9 SEC (ref 0–47)
THROMBIN TIME: 19.7 SEC (ref 0–23)

## 2021-06-25 ENCOUNTER — HOSPITAL ENCOUNTER (OUTPATIENT)
Dept: PET IMAGING | Facility: HOSPITAL | Age: 61
Discharge: HOME | End: 2021-06-25
Admitting: INTERNAL MEDICINE

## 2021-06-25 DIAGNOSIS — K76.9 LESION OF LIVER: ICD-10-CM

## 2021-06-25 DIAGNOSIS — I82.412 ACUTE DEEP VEIN THROMBOSIS (DVT) OF FEMORAL VEIN OF LEFT LOWER EXTREMITY (HCC): ICD-10-CM

## 2021-06-25 LAB — CREAT BLDA-MCNC: 1.3 MG/DL (ref 0.6–1.3)

## 2021-06-25 PROCEDURE — 71260 CT THORAX DX C+: CPT

## 2021-06-25 PROCEDURE — 82565 ASSAY OF CREATININE: CPT

## 2021-06-25 PROCEDURE — 0 DIATRIZOATE MEGLUMINE & SODIUM PER 1 ML: Performed by: INTERNAL MEDICINE

## 2021-06-25 PROCEDURE — 25010000002 IOPAMIDOL 61 % SOLUTION: Performed by: INTERNAL MEDICINE

## 2021-06-25 PROCEDURE — 74177 CT ABD & PELVIS W/CONTRAST: CPT

## 2021-06-25 RX ADMIN — DIATRIZOATE MEGLUMINE AND DIATRIZOATE SODIUM 30 ML: 660; 100 LIQUID ORAL; RECTAL at 08:10

## 2021-06-25 RX ADMIN — IOPAMIDOL 85 ML: 612 INJECTION, SOLUTION INTRAVENOUS at 08:54

## 2021-06-30 LAB
ANTI-PHOSPHATIDIC ACID: NORMAL
ANTI-PHOSPHATIDYL GLYCEROL: NORMAL
ANTI-PHOSPHATIDYL INOSITOL: NORMAL
ANTI-PHOSPHATIDYLETHANOLAMINE: NORMAL
PE IGA SER-ACNC: 2.5 U/ML
PE IGG SER-ACNC: 1.1 U/ML
PE IGM SER-ACNC: 9.6 U/ML
PG IGA SER-ACNC: 4.1 U/ML
PG IGG SER-ACNC: 4.4 U/ML
PG IGM SER-ACNC: 3.8 U/ML
PHOSPHATIDATE IGA SER-ACNC: 7.3 U/ML
PHOSPHATIDATE IGG SER-ACNC: 5.7 U/ML
PHOSPHATIDATE IGM SER-ACNC: 2.6 U/ML
PI IGA SER-ACNC: 3.6 U/ML
PI IGG SER-ACNC: 8 U/ML
PI IGM SER-ACNC: 1.9 U/ML

## 2021-07-01 ENCOUNTER — OFFICE VISIT (OUTPATIENT)
Dept: ONCOLOGY | Facility: CLINIC | Age: 61
End: 2021-07-01

## 2021-07-01 ENCOUNTER — APPOINTMENT (OUTPATIENT)
Dept: OTHER | Facility: HOSPITAL | Age: 61
End: 2021-07-01

## 2021-07-01 VITALS
TEMPERATURE: 96.9 F | HEIGHT: 70 IN | RESPIRATION RATE: 18 BRPM | DIASTOLIC BLOOD PRESSURE: 85 MMHG | WEIGHT: 189.9 LBS | HEART RATE: 66 BPM | SYSTOLIC BLOOD PRESSURE: 129 MMHG | BODY MASS INDEX: 27.19 KG/M2 | OXYGEN SATURATION: 96 %

## 2021-07-01 DIAGNOSIS — N28.1 RENAL CYST, RIGHT: ICD-10-CM

## 2021-07-01 DIAGNOSIS — D18.03 HEMANGIOMA OF LIVER: ICD-10-CM

## 2021-07-01 DIAGNOSIS — I82.412 ACUTE DEEP VEIN THROMBOSIS (DVT) OF FEMORAL VEIN OF LEFT LOWER EXTREMITY (HCC): Primary | ICD-10-CM

## 2021-07-01 DIAGNOSIS — D68.59 LOW PROTEIN C ACTIVITY LEVEL (HCC): ICD-10-CM

## 2021-07-01 LAB — F5 GENE MUT ANL BLD/T: NORMAL

## 2021-07-01 PROCEDURE — 99215 OFFICE O/P EST HI 40 MIN: CPT | Performed by: INTERNAL MEDICINE

## 2021-07-01 RX ORDER — LEVOTHYROXINE, LIOTHYRONINE 38; 9 UG/1; UG/1
TABLET ORAL
COMMUNITY
Start: 2021-05-03 | End: 2022-10-12

## 2021-07-01 NOTE — PROGRESS NOTES
.     REASON FOR FOLLOWUP:     Recurrent lower extremity acute DVT.   · First episode of left leg calf vein DVT in June 2020 after long distance travel by automobile, treated with Eliquis for 6 weeks.  · Left common femoral vein acute DVT discovered on 5/28/2021.  Patient was started on Eliquis anticoagulation.                               HISTORY OF PRESENT ILLNESS:  The patient is a 60 y.o. year old male with seasonal allergies and recurrent left leg DVT but otherwise no chronic medical problem who presented today for reevaluation to discuss laboratory results as well as CT scan results, and to discuss further management plan.      Patient reports he has been on Eliquis anticoagulation with good compliance.  He reports no easy bleeding or bruising.  No chest pain or dyspnea.  His left leg edema and the achiness has improved.  He has been wearing compression stocks.    Comprehensive hypercoagulable work-up on 6/18/2021 reported slightly decreased Antithrombin activity 77% with normal above 90%, slightly decreased protein C activity 78%, normal above 86%, but a normal protein S activity 122% and the protein is free antigen 116%.  It was negative for factor II mutation.  Laboratory studies were also positive for lupus anticoagulant, however negative for already other antiphospholipid antibodies including Beta-2 Glycoprotein Antibodies, Cardiolipin Antibody, Phosphatidylserine Antibodies, Antiphosphotidyl Antibodies Panel II.     However his insurance company declined factor V Leiden mutation study.    CT scan examination for chest abdomen pelvis obtained on 6/25/2021 reported multiple liver lesions, fits with hepatic hemangioma largest 5 cm, and also a 3 cm right renal cyst.  There was moderate sigmoid diverticulosis.  There is no evidence of malignancy, no lymphadenopathy.      Past Medical History:   Diagnosis Date   • Acute deep vein thrombosis (DVT) of femoral vein of left lower extremity (CMS/HCC) 6/18/2021    • Allergic Years ago   • Deep vein thrombosis (DVT) (CMS/HCC)     RLE   • Erectile dysfunction 2 years ago   • Hemangioma of liver    • Seasonal allergies    • Shoulder injury surgery years ago     Past Surgical History:   Procedure Laterality Date   • COLONOSCOPY  2011   • COLONOSCOPY N/A 12/7/2020    Procedure: COLONOSCOPY TO CECUM AND TERMINAL ILEUM;  Surgeon: Uche Drew MD;  Location: [facility];  Service: Gastroenterology;  Laterality: N/A;  PRE- FAMILY HISTORY OF COLON CANCER  POST- DIVERTICULOSIS, HEMORRHOIDS   • EYE SURGERY      laser surgery   • SHOULDER SURGERY Bilateral 2011    exploratory arthroscopy   • VASECTOMY      Years ago   • WISDOM TOOTH EXTRACTION       HEMATOLOGY HISTORY: The patient is a 60 y.o. year old male with seasonal allergies but otherwise no chronic medical problem who presented on 6/18/2020 for recurrent acute DVT. He is referred by Vascular Surgeon, Alf Schwartz MD.     This patient had 1st episode of left calf vein DVT in 06/2020, for which he was given Eliquis for about 6 weeks according to patient himself. He had long distance traveling prior to that episode of DVT, from [place] to [place], with no stopping and resting on the way.     This time the patient had long distance travel again, however he was stopping frequently and drinking plenty of water, nevertheless he complained of pain and palpable venous cord on his medial side of his left thigh and he noted left calf pain earlier in 05/2021 when he was down in [place]. He actually went to a local emergency room, with venous Doppler study negative. After he returned to [place], patient had a venous Doppler study on 5/6/2021 reported no evidence for lower extremity DVT in the left leg.  However patient complains worsening of tightness and redness in the left thigh. When he presented to Dr. Schwartz's office on 05/28/2021 for reevaluation there was a palpable red cord in the left thigh. It was tender to  palpation. The patient had repeat venous Doppler study on 2021 which reported acute left common femoral vein DVT, there was also acute thrombophlebitis in the left great saphenous vein. The right leg is negative for any venous thrombosis.     The patient was started on Eliquis 5 mg for anticoagulation.     This patient has history of abnormal CT of the abdomen obtained on 2020 which reported liver lesions, and up to 4.8 cm. The patient subsequently had abdomen MRI examination which reported possible hepatic hemangioma. The patient also had evidence of renal cyst.     This patient has no significant history of cigarette smoking. He has no family history for thrombosis.        MEDICATIONS    Current Outpatient Medications:   •  cetirizine (zyrTEC) 10 MG tablet, Take 10 mg by mouth Daily., Disp: , Rfl:   •  Eliquis 5 MG tablet tablet, , Disp: , Rfl:   •  NP Thyroid 15 MG tablet, , Disp: , Rfl:   •  NP Thyroid 60 MG tablet, , Disp: , Rfl:   •  clomiPHENE (CLOMID) 50 MG tablet, Take 50 mg by mouth Daily., Disp: , Rfl:     ALLERGIES:   No Known Allergies    SOCIAL HISTORY:       Social History     Socioeconomic History   • Marital status:      Spouse name: Rupa   • Number of children: Not on file   • Years of education: Not on file   • Highest education level: Not on file   Tobacco Use   • Smoking status: Never Smoker   • Smokeless tobacco: Never Used   Vaping Use   • Vaping Use: Never used   Substance and Sexual Activity   • Alcohol use: Yes     Types: 2 Glasses of wine per week     Comment: social   • Drug use: No   • Sexual activity: Yes     Partners: Female         FAMILY HISTORY:  Family History   Problem Relation Age of Onset   • Liver cancer Mother    • Cancer Mother             • Other Mother         Blood clots   · No family history of thrombophilia.     REVIEW OF SYSTEMS:  Review of Systems   Constitutional: Negative for activity change, appetite change, diaphoresis and unexpected  "weight change. Fatigue: Start recently for 3 to 4 weeks as reported on 6/18/2021, no changes.   HENT: Negative for congestion, nosebleeds and sore throat.    Eyes: Negative for visual disturbance.   Respiratory: Negative for cough and shortness of breath.    Cardiovascular: Positive for leg swelling (Left thigh swelling has significantly improved). Negative for chest pain.   Gastrointestinal: Negative for abdominal pain, anal bleeding, blood in stool and nausea.   Endocrine: Negative for cold intolerance.   Genitourinary: Negative for dysuria and hematuria.   Musculoskeletal: Negative for arthralgias and joint swelling.   Skin: Negative for color change and pallor.   Allergic/Immunologic: Positive for environmental allergies. Negative for immunocompromised state.   Neurological: Negative for dizziness, syncope and headaches.   Hematological: Negative for adenopathy. Does not bruise/bleed easily.   Psychiatric/Behavioral: Negative for agitation and sleep disturbance.              Vitals:    07/01/21 0805   BP: 129/85   Pulse: 66   Resp: 18   Temp: 96.9 °F (36.1 °C)   TempSrc: Temporal   SpO2: 96%   Weight: 86.1 kg (189 lb 14.4 oz)   Height: 177.8 cm (70\")   PainSc: 0-No pain     Current Status 7/1/2021   ECOG score 0      PHYSICAL EXAM:    GENERAL:  Well-developed, well-nourished male in no acute distress.  Orientated to time place and the people.  SKIN:  Warm, dry without rashes, purpura or petechiae.  HEENT:  Normocephalic.  Wearing mask.   LYMPHATICS:  No cervical, supraclavicular adenopathy.  CHEST: Normal respiratory effort.  Lungs clear to auscultation. Good airflow.  CARDIAC:  Regular rate and rhythm without murmurs. Normal S1,S2.  ABDOMEN:  Soft, nontender with no organomegaly or masses.  Bowel sounds normal.  EXTREMITIES:  No clubbing, cyanosis or edema.  NEUROLOGICAL:  Grossly intact.    PSYCHIATRIC:  Normal affect and mood.        RECENT LABS:  Lab Results   Component Value Date    WBC 4.62 06/18/2021    " HGB 14.2 06/18/2021    HCT 41.4 06/18/2021    MCV 90.4 06/18/2021     06/18/2021     Lab Results   Component Value Date    NEUTROABS 2.21 06/18/2021     Component      Latest Ref Rng & Units 6/18/2021 6/18/2021           9:05 AM  9:05 AM   Anti-Phosphatidyl Inositol       Comment    Anti-Phosphatidyl,IgA      <12.0 U/mL 3.6    Anti-Phosphatidyl,IgG      <12.0 U/mL 8.0    Anti-Phosphatidyl,IgM      <12.0 U/mL 1.9    Anti-Phosphatidic Acid       Comment    Anti-Phosphatidic,IgA      <12.0 U/mL 7.3    Anti-Phosphatidic,IgG      <12.0 U/mL 5.7    Anti-Phosphatidic,IgM      <12.0 U/mL 2.6    Anti-Phosphatidylethanolamine       Comment    Anti-Phosphatidylethanolamine, IgA      <12.0 U/mL 2.5    Anti-Phosphatidylethanolamine, IgG      <12.0 U/mL 1.1    Anti-Phosphatidylethanolamine, IgM      <12.0 U/mL 9.6    Anti-Phosphatidyl Glycerol       Comment    Anti-Phosphatidyl Glycerol, IgA      <12.0 U/mL 4.1    Anti-Phosphatidyl Glycerol, IgG      <12.0 U/mL 4.4    Anti-Phosphatidyl Glycerol, IgM      <12.0 U/mL 3.8    Dilute Prothrombin Time(dPT)      0.0 - 55.0 sec 43.8    dPT Confirm Ratio      0.00 - 1.40 Ratio 1.28    Thrombin Time      0.0 - 23.0 sec 19.7    PTT LA      0.0 - 51.9 sec 34.6    Dilute Viper Venom Time      0.0 - 47.0 sec 59.9 (H) 1.3 (H)   Lupus Anticoagulant Reflex       Comment:    Beta-2 Glyco 1 IgG      0 - 20 GPI IgG units <9    Beta-2 Glyco 1 IgA      0 - 25 GPI IgA units <9    Beta-2 Glyco 1 IgM      0 - 32 GPI IgM units <9    Anticardiolipin IgG      0 - 14 GPL U/mL <9    Anticardiolipin IgM      0 - 12 MPL U/mL 10    Anticardiolipin IgA      0 - 11 APL U/mL <9    Antiphosphatidylserine IgM      0 - 25 MPS IgM 4    Antiphosphatidylserine IgA      0 - 20 APS IgA 1    Antiphosphatidylserine IgG      0 - 11 GPS IgG 2    ANTITHROMBIN ACTIVITY      90 - 134 % 77 (L)    Protein C Activity      86 - 163 % 78 (L)    Protein S Ag, Free      49.0 - 138.0 % 116.0    Protein S Functional      70 - 127 %  122    Factor II, DNA Analysis      Normal Normal    Dilute Viper Venom 1:1 Mix      0.0 - 40.4 sec 44.7 (H)          IMAGING STUDY:  CT CHEST, ABDOMEN, AND PELVIS WITH IV CONTRAST 6/25/2021     HISTORY: 60-year-old male with history of DVT. Liver lesions  characterized by MRI as hemangiomas.     TECHNIQUE: Radiation dose reduction techniques were utilized, including  automated exposure control and exposure modulation based on body size.   3 mm images were obtained through the chest, abdomen, and pelvis after  the administration of IV contrast. Compared with previous CTs from  07/06/2020 and liver MRI from 08/03/2020.     FINDINGS:  CHEST: There are no new or suspicious pulmonary opacities and there are  no pleural or pericardial effusions. There is no lymphadenopathy within  the chest and there is no axillary lymphadenopathy. Visualized aspect of  the thyroid appears unremarkable.     ABDOMEN/PELVIS: There is no significant change in the appearance of the  liver lesions with the largest lesion at the posterior hepatic segment  measuring approximately 5 cm and the smaller lesions ranging in size  between 1-2 cm. The gallbladder, pancreas, adrenals, and kidneys appear  unremarkable other than a stable 3.2 cm right renal cyst. Splenic size  is normal. There is no acute bowel abnormality. The appendix appears  within normal limits. There is a moderate degree of sigmoid  diverticulosis without evidence for diverticulitis. There is no free  fluid or lymphadenopathy. There are no significant abdominal aortic  atherosclerotic changes. There is a tiny umbilical hernia containing  fat.     IMPRESSION:  1. Stable hepatic hemangiomas. Approximately 3 cm right renal cyst is  stable. There is no lymphadenopathy or new abnormality.  2. Sigmoid diverticulosis without evidence for diverticulitis.         Assessment/Plan     ASSESSMENT:   *  Recurrent left leg acute DVT.  · His first left leg calf vein acute DVT in 06/2020 after  long distance travel.  Treated with Eliquis for 6 weeks.   · This time happened with the much significant left common femoral vein acute DVT together with the left greater saphenous vein superficial thrombophlebitis diagnosed on 05/28/2021 after long distance travel, however he was stopping frequently and drinking plenty of water.   · Comprehensive laboratory work-up for hypercoagulable status 6/18/2021 reported mildly decreased Antithrombin activity 77%, and protein C activity 78% and mildly positive lupus anticoagulant.  Insurance company declined factor V Leiden mutation study.  Otherwise negative including normal protein S, negative factor II mutation, and antiphospholipid antibodies.    · Today on 7/1/2021 patient reports his left leg edema and the pain has improved.  He uses compression socks.  I discussed with the patient, about laboratory results, I think the mildly decreased protein C activity and Antithrombin activity will probably consumption and the not the true culprit of his recurrent DVT.  He is weakly positive lupus anticoagulant could be caused by Eliquis.  I recommended to repeat his in the future.  In the meantime I recommended anticoagulation for 12 months starting from his diagnosis in the end of May 2021.  Nevertheless we need to have factor V Leiden mutation study, together repeating the abnormal studies including protein C activity, Antithrombin activity and antilupus anticoagulant in 4 months.  I also would like to have repeated a venous Doppler study at that time.  Whether this patient needs more than 12 months anticoagulation or even lifelong anticoagulation needs to be discussed, obtaining laboratory study results and how much response he has towards Eliquis anticoagulation.    ·     *Liver lesions.  As reported as hemangioma.  It looks stable per radiologist.    *Right kidney cyst 3 cm.  I assured patient that this is benign.  No evidence of malignancy by CT scan examination for chest  abdomen pelvis.      PLAN:  1. We are waiting to have a peer to peer review with his insurance company for factor 5 Leiden study.  2. Continue Eliquis 5 mg twice a day at least until the end of May 2022.   3. Arrange venous Doppler study for the left leg in 4 months. (Patient reports he has a scheduled venous Doppler study in 1 month at the Dr. Schwartz's office, if that shows that he has complete resolution of DVT then we do not need to repeat Doppler study in 4 months.  He voiced understanding. )  4. Repeat laboratory studies in 4 months including following  - Antithrombin III  - Lupus Anticoagulant  - Protein C Activity  - Protein C Antigen   5.  I will see patient in 4 months, 1 week after laboratory studies and the venous Doppler study.  We discussed again for long-term treatment plan.      Discussed with the patient about the CT scan results and the laboratory study results, and I discussed the current treatment plan.  He voiced understanding and agreeable for the recommendations.       Addendum:   I spoke with the insurance company physician, discussed the current condition, and certainly factor V Leyden mutation will be very important in determining the long-term treatment plan.  If he is positive for factor V Leyden mutation, most likely he will need lifelong anticoagulation.  Otherwise 12 months anticoagulation treatment would be sufficient if he had complete resolution of DVT.    We obtained authorization for the factor V Leiden test #[id number].    We will get the patient to test for this at earliest time.      Addendum #2:  His blood sample stored in the laboratory was tested negative for factor V Leiden mutation today.      I shared the CT scan images from 6/25/2021 with the patient today.      More than 40 min were used for patient care, over half of that time were for counseling.      JAM HERNANDEZ M.D., Ph.D.    7/1/2021      CC:   MD Dale García Jr. DO

## 2021-10-06 ENCOUNTER — LAB (OUTPATIENT)
Dept: LAB | Facility: HOSPITAL | Age: 61
End: 2021-10-06

## 2021-10-06 ENCOUNTER — HOSPITAL ENCOUNTER (OUTPATIENT)
Dept: CARDIOLOGY | Facility: HOSPITAL | Age: 61
Discharge: HOME | End: 2021-10-06
Admitting: INTERNAL MEDICINE

## 2021-10-06 DIAGNOSIS — I82.412 ACUTE DEEP VEIN THROMBOSIS (DVT) OF FEMORAL VEIN OF LEFT LOWER EXTREMITY (HCC): ICD-10-CM

## 2021-10-06 LAB
BASOPHILS # BLD AUTO: 0.04 10*3/MM3 (ref 0–0.2)
BASOPHILS NFR BLD AUTO: 0.9 % (ref 0–1.5)
BH CV LOWER VASCULAR LEFT COMMON FEMORAL AUGMENT: NORMAL
BH CV LOWER VASCULAR LEFT COMMON FEMORAL COMPETENT: NORMAL
BH CV LOWER VASCULAR LEFT COMMON FEMORAL COMPRESS: NORMAL
BH CV LOWER VASCULAR LEFT COMMON FEMORAL PHASIC: NORMAL
BH CV LOWER VASCULAR LEFT COMMON FEMORAL SPONT: NORMAL
BH CV LOWER VASCULAR LEFT DISTAL FEMORAL COMPRESS: NORMAL
BH CV LOWER VASCULAR LEFT GASTRONEMIUS COMPRESS: NORMAL
BH CV LOWER VASCULAR LEFT GREATER SAPH AK COMPRESS: NORMAL
BH CV LOWER VASCULAR LEFT GREATER SAPH BK COMPRESS: NORMAL
BH CV LOWER VASCULAR LEFT LESSER SAPH COMPRESS: NORMAL
BH CV LOWER VASCULAR LEFT MID FEMORAL AUGMENT: NORMAL
BH CV LOWER VASCULAR LEFT MID FEMORAL COMPETENT: NORMAL
BH CV LOWER VASCULAR LEFT MID FEMORAL COMPRESS: NORMAL
BH CV LOWER VASCULAR LEFT MID FEMORAL PHASIC: NORMAL
BH CV LOWER VASCULAR LEFT MID FEMORAL SPONT: NORMAL
BH CV LOWER VASCULAR LEFT PERONEAL COMPRESS: NORMAL
BH CV LOWER VASCULAR LEFT POPLITEAL AUGMENT: NORMAL
BH CV LOWER VASCULAR LEFT POPLITEAL COMPETENT: NORMAL
BH CV LOWER VASCULAR LEFT POPLITEAL COMPRESS: NORMAL
BH CV LOWER VASCULAR LEFT POPLITEAL PHASIC: NORMAL
BH CV LOWER VASCULAR LEFT POPLITEAL SPONT: NORMAL
BH CV LOWER VASCULAR LEFT POSTERIOR TIBIAL COMPRESS: NORMAL
BH CV LOWER VASCULAR LEFT PROFUNDA FEMORAL COMPRESS: NORMAL
BH CV LOWER VASCULAR LEFT PROXIMAL FEMORAL COMPRESS: NORMAL
BH CV LOWER VASCULAR LEFT SAPHENOFEMORAL JUNCTION COMPRESS: NORMAL
BH CV LOWER VASCULAR RIGHT COMMON FEMORAL AUGMENT: NORMAL
BH CV LOWER VASCULAR RIGHT COMMON FEMORAL COMPETENT: NORMAL
BH CV LOWER VASCULAR RIGHT COMMON FEMORAL COMPRESS: NORMAL
BH CV LOWER VASCULAR RIGHT COMMON FEMORAL PHASIC: NORMAL
BH CV LOWER VASCULAR RIGHT COMMON FEMORAL SPONT: NORMAL
DEPRECATED RDW RBC AUTO: 43.3 FL (ref 37–54)
EOSINOPHIL # BLD AUTO: 0.12 10*3/MM3 (ref 0–0.4)
EOSINOPHIL NFR BLD AUTO: 2.8 % (ref 0.3–6.2)
ERYTHROCYTE [DISTWIDTH] IN BLOOD BY AUTOMATED COUNT: 13.1 % (ref 12.3–15.4)
HCT VFR BLD AUTO: 41.8 % (ref 37.5–51)
HGB BLD-MCNC: 14.2 G/DL (ref 13–17.7)
IMM GRANULOCYTES # BLD AUTO: 0.01 10*3/MM3 (ref 0–0.05)
IMM GRANULOCYTES NFR BLD AUTO: 0.2 % (ref 0–0.5)
LYMPHOCYTES # BLD AUTO: 1.63 10*3/MM3 (ref 0.7–3.1)
LYMPHOCYTES NFR BLD AUTO: 38.2 % (ref 19.6–45.3)
MAXIMAL PREDICTED HEART RATE: 161 BPM
MCH RBC QN AUTO: 30.7 PG (ref 26.6–33)
MCHC RBC AUTO-ENTMCNC: 34 G/DL (ref 31.5–35.7)
MCV RBC AUTO: 90.5 FL (ref 79–97)
MONOCYTES # BLD AUTO: 0.45 10*3/MM3 (ref 0.1–0.9)
MONOCYTES NFR BLD AUTO: 10.5 % (ref 5–12)
NEUTROPHILS NFR BLD AUTO: 2.02 10*3/MM3 (ref 1.7–7)
NEUTROPHILS NFR BLD AUTO: 47.4 % (ref 42.7–76)
NRBC BLD AUTO-RTO: 0 /100 WBC (ref 0–0.2)
PLATELET # BLD AUTO: 190 10*3/MM3 (ref 140–450)
PMV BLD AUTO: 9.9 FL (ref 6–12)
RBC # BLD AUTO: 4.62 10*6/MM3 (ref 4.14–5.8)
STRESS TARGET HR: 137 BPM
WBC # BLD AUTO: 4.27 10*3/MM3 (ref 3.4–10.8)

## 2021-10-06 PROCEDURE — 85303 CLOT INHIBIT PROT C ACTIVITY: CPT | Performed by: INTERNAL MEDICINE

## 2021-10-06 PROCEDURE — 85025 COMPLETE CBC W/AUTO DIFF WBC: CPT

## 2021-10-06 PROCEDURE — 36415 COLL VENOUS BLD VENIPUNCTURE: CPT

## 2021-10-06 PROCEDURE — 85300 ANTITHROMBIN III ACTIVITY: CPT | Performed by: INTERNAL MEDICINE

## 2021-10-06 PROCEDURE — 93971 EXTREMITY STUDY: CPT

## 2021-10-08 LAB
AT III PPP CHRO-ACNC: 102 % (ref 90–134)
PROT C ACT/NOR PPP: 88 % (ref 86–163)
PROT C AG ACT/NOR PPP IA: 78 % (ref 60–150)

## 2021-10-09 LAB
APTT SCREEN TO CONFIRM RATIO: 0.79 RATIO (ref 0–1.4)
CONFIRM APTT/NORMAL: 43.7 SEC (ref 0–55)
DRVVT SCREEN TO CONFIRM RATIO: 1.2 RATIO (ref 0.8–1.2)
LA 2 SCREEN W REFLEX-IMP: ABNORMAL
MIXING DRVVT: 42.6 SEC (ref 0–40.4)
SCREEN APTT: 35.4 SEC (ref 0–51.9)
SCREEN DRVVT: 54.3 SEC (ref 0–47)
THROMBIN TIME: 18.7 SEC (ref 0–23)

## 2021-10-14 ENCOUNTER — TELEPHONE (OUTPATIENT)
Dept: ONCOLOGY | Facility: CLINIC | Age: 61
End: 2021-10-14

## 2021-10-14 NOTE — TELEPHONE ENCOUNTER
SARAH KIERA CALLED HAS APPT 10/15 [facility] VITALS AND 4 MONTH F/U , VITALS AT 10:20 AND 10:40 SEE DR HERNANDEZ,  WANTED TO MAKE SURE HAD ENOUGH TIME HAS A PRESS CONFERENCE EARLY AFTERNOON.    I ADV LENGTH OF APPT ESTIMATED  WITH DR HERNANDEZ TO GO OVER LAB RESULTS 20MIN.      PT WANTED TO KNOW IF EARLIER AVAILABLITY THAT DAY 10/15 ADV DID NOT HAVE AN EARLIER APPT COULD SCHEDULE TO , AND THE NEXT APPT AVAILABLE IS NOT SHOWING UNTIL 11/02 HE V/U.      PT STATED WILL MAKE A PHONE CALL AND CALL BACK TO CONFIRM APPT OR R/S IF NEEDED.

## 2021-10-15 ENCOUNTER — OFFICE VISIT (OUTPATIENT)
Dept: ONCOLOGY | Facility: CLINIC | Age: 61
End: 2021-10-15

## 2021-10-15 ENCOUNTER — APPOINTMENT (OUTPATIENT)
Dept: LAB | Facility: HOSPITAL | Age: 61
End: 2021-10-15

## 2021-10-15 VITALS
BODY MASS INDEX: 26.21 KG/M2 | OXYGEN SATURATION: 98 % | TEMPERATURE: 97.5 F | HEIGHT: 70 IN | DIASTOLIC BLOOD PRESSURE: 72 MMHG | SYSTOLIC BLOOD PRESSURE: 128 MMHG | RESPIRATION RATE: 18 BRPM | WEIGHT: 183.1 LBS | HEART RATE: 64 BPM

## 2021-10-15 DIAGNOSIS — Z86.718 HISTORY OF DVT OF LOWER EXTREMITY: Primary | ICD-10-CM

## 2021-10-15 PROCEDURE — 99214 OFFICE O/P EST MOD 30 MIN: CPT | Performed by: INTERNAL MEDICINE

## 2021-10-26 ENCOUNTER — OFFICE VISIT (OUTPATIENT)
Dept: SPORTS MEDICINE | Facility: CLINIC | Age: 61
End: 2021-10-26

## 2021-10-26 VITALS
OXYGEN SATURATION: 99 % | DIASTOLIC BLOOD PRESSURE: 60 MMHG | SYSTOLIC BLOOD PRESSURE: 118 MMHG | HEART RATE: 98 BPM | RESPIRATION RATE: 16 BRPM | HEIGHT: 70 IN | TEMPERATURE: 96.8 F | BODY MASS INDEX: 26.2 KG/M2 | WEIGHT: 183 LBS

## 2021-10-26 DIAGNOSIS — H93.13 TINNITUS, BILATERAL: Primary | ICD-10-CM

## 2021-10-26 PROCEDURE — 99213 OFFICE O/P EST LOW 20 MIN: CPT | Performed by: FAMILY MEDICINE

## 2021-10-26 NOTE — PROGRESS NOTES
"Yevgeniy is a 60 y.o. year old male presents to [facility]    Chief Complaint   Patient presents with   • Tinnitus     eval for b/l ear ringing/pressure - has been taking OTC meds with some relief - pressure has relieved itself but still has a ringing - reports some fatigue as well, assumes it's allergies but wants to be evaled - here for further evaluation and treatment        History of Present Illness  Onset 1.5 wks ago. R ear primarily, \"felt like I had gone swimming.\" Associated muffled sounds but that has improved. Now ringing in b/l ears. No fever or chills.     Went thru increase in allergic rhinitis sxs 1 mo ago. This felt different. Flonase, Zyrtec, Sudafed since last wk.    I have reviewed the patient's medical, family, and social history in detail and updated the computerized patient record.    /60 (BP Location: Right arm, Patient Position: Sitting, Cuff Size: Adult)   Pulse 98   Temp 96.8 °F (36 °C) (Temporal)   Resp 16   Ht 177.8 cm (70\")   Wt 83 kg (183 lb)   SpO2 99%   BMI 26.26 kg/m²      Physical Exam    Mask worn thru encounter  Vital signs reviewed.   General: No acute distress.  Eyes: conjunctiva clear; pupils equally round and reactive  ENT: external ears atraumatic; nml appearing TMs, not bulging  CV: no peripheral edema  Resp: normal respiratory effort, no use of accessory muscles  Skin: no rashes or wounds; normal turgor  Psych: mood and affect appropriate; recent and remote memory intact  Neuro: sensation to light touch intact                  Diagnoses and all orders for this visit:    Tinnitus, bilateral    reassurance given today. Stop Sudafed tomorrow. F/up if persists.        Follow Up   No follow-ups on file.  Patient was given instructions and counseling regarding his condition or for health maintenance advice. Please see specific information pulled into the AVS if appropriate.     EMR Dragon/Transcription disclaimer:    Much of this " encounter note is an electronic transcription/translation of spoken language to printed text.  The electronic translation of spoken language may permit erroneous, or at times, nonsensical words or phrases to be inadvertently transcribed.  Although I have reviewed the note for such errors some may still exist.

## 2021-11-07 PROBLEM — D68.59 LOW PROTEIN C ACTIVITY LEVEL (HCC): Status: RESOLVED | Noted: 2021-07-01 | Resolved: 2021-11-07

## 2021-11-16 ENCOUNTER — TELEPHONE (OUTPATIENT)
Dept: ONCOLOGY | Facility: CLINIC | Age: 61
End: 2021-11-16

## 2021-11-16 NOTE — TELEPHONE ENCOUNTER
Per Dr. Castellanos patient to continue Eliquis 2.5 mg PO twice a day until follows up with Dr. Castellanos on 4/15/2021.    Patient v/u

## 2021-11-16 NOTE — TELEPHONE ENCOUNTER
Caller: Yevgeniy Rodriguez    Relationship: Self    Best call back number: [phone]    What is the best time to reach you: ANYTIME - LEAVE VM IF NO ANSWER.      Who are you requesting to speak with (clinical staff, provider,  specific staff member): NURSE     What was the call regarding: PATIENT CALLED REGARDING HIS CURRENT DOSE OF ELIQUIS.  THIS HAD BEEN REDUCED DOWN TO A MAINTENANCE DOSE  ON 10/15/21.  PATIENT THEN SAW DR GARZA ON 10/28/21 AND DR. GARZA INFORMED PATIENT THAT HE IS OK IF PATIENT DISCONTINUES ELIQUIS ALTOGETHER, BUT ASKED THAT PATIENT CONFIRM THIS WITH DR. HERNANDEZ FIRST.      Do you require a callback: YES

## 2022-04-19 ENCOUNTER — TELEPHONE (OUTPATIENT)
Dept: ONCOLOGY | Facility: CLINIC | Age: 62
End: 2022-04-19

## 2022-04-19 NOTE — TELEPHONE ENCOUNTER
Caller: Yevgeniy Rodriguez    Relationship to patient: Self    Best call back number: [phone]  CAN CALL ANYTIME AND MAY LEAVE  IF NEEDED.    Chief complaint: PATIENT HAD SPOKE WITH SCHEDULING STAFF ON 4/15/22 WHO STATED HIS APPT WOULD BE STARTING AT 8AM.    Type of visit: LAB/FOLLOW UP    Requested date: 4/25/22     If rescheduling, when is the original appointment: 4/25/22     Additional notes:PATIENT NEEDS TO HAVE LAB AND FOLLOW UP APPT START TIME MOVED TO 8AM.  PATIENT CANNOT MAKE 10AM APPT DUE TO HE HAS A BOARD MEETING AT 11AM.  ONCE ADDRESSED AND DETERMINATION IS MADE PLEASE REACH OUT TO PATIENT ASAP.        [facility]

## 2022-04-20 NOTE — TELEPHONE ENCOUNTER
Attempted to call no answer. Left message.  Patient's appointment rescheduled for 4/26/2022 @ 7:30 am

## 2022-04-25 ENCOUNTER — APPOINTMENT (OUTPATIENT)
Dept: LAB | Facility: HOSPITAL | Age: 62
End: 2022-04-25

## 2022-04-26 ENCOUNTER — OFFICE VISIT (OUTPATIENT)
Dept: ONCOLOGY | Facility: CLINIC | Age: 62
End: 2022-04-26

## 2022-04-26 ENCOUNTER — APPOINTMENT (OUTPATIENT)
Dept: LAB | Facility: HOSPITAL | Age: 62
End: 2022-04-26

## 2022-04-26 ENCOUNTER — LAB (OUTPATIENT)
Dept: LAB | Facility: HOSPITAL | Age: 62
End: 2022-04-26

## 2022-04-26 VITALS
TEMPERATURE: 96.9 F | HEART RATE: 61 BPM | DIASTOLIC BLOOD PRESSURE: 78 MMHG | HEIGHT: 70 IN | BODY MASS INDEX: 26.18 KG/M2 | WEIGHT: 182.9 LBS | RESPIRATION RATE: 18 BRPM | SYSTOLIC BLOOD PRESSURE: 127 MMHG | OXYGEN SATURATION: 95 %

## 2022-04-26 DIAGNOSIS — I82.412 ACUTE DEEP VEIN THROMBOSIS (DVT) OF FEMORAL VEIN OF LEFT LOWER EXTREMITY: Primary | ICD-10-CM

## 2022-04-26 DIAGNOSIS — Z86.718 HISTORY OF DVT OF LOWER EXTREMITY: ICD-10-CM

## 2022-04-26 LAB
BASOPHILS # BLD AUTO: 0.04 10*3/MM3 (ref 0–0.2)
BASOPHILS NFR BLD AUTO: 0.9 % (ref 0–1.5)
DEPRECATED RDW RBC AUTO: 42 FL (ref 37–54)
EOSINOPHIL # BLD AUTO: 0.1 10*3/MM3 (ref 0–0.4)
EOSINOPHIL NFR BLD AUTO: 2.1 % (ref 0.3–6.2)
ERYTHROCYTE [DISTWIDTH] IN BLOOD BY AUTOMATED COUNT: 12.9 % (ref 12.3–15.4)
HCT VFR BLD AUTO: 41.6 % (ref 37.5–51)
HGB BLD-MCNC: 14.5 G/DL (ref 13–17.7)
IMM GRANULOCYTES # BLD AUTO: 0.02 10*3/MM3 (ref 0–0.05)
IMM GRANULOCYTES NFR BLD AUTO: 0.4 % (ref 0–0.5)
LYMPHOCYTES # BLD AUTO: 1.74 10*3/MM3 (ref 0.7–3.1)
LYMPHOCYTES NFR BLD AUTO: 37.3 % (ref 19.6–45.3)
MCH RBC QN AUTO: 31 PG (ref 26.6–33)
MCHC RBC AUTO-ENTMCNC: 34.9 G/DL (ref 31.5–35.7)
MCV RBC AUTO: 88.9 FL (ref 79–97)
MONOCYTES # BLD AUTO: 0.44 10*3/MM3 (ref 0.1–0.9)
MONOCYTES NFR BLD AUTO: 9.4 % (ref 5–12)
NEUTROPHILS NFR BLD AUTO: 2.33 10*3/MM3 (ref 1.7–7)
NEUTROPHILS NFR BLD AUTO: 49.9 % (ref 42.7–76)
NRBC BLD AUTO-RTO: 0 /100 WBC (ref 0–0.2)
PLATELET # BLD AUTO: 188 10*3/MM3 (ref 140–450)
PMV BLD AUTO: 10.2 FL (ref 6–12)
RBC # BLD AUTO: 4.68 10*6/MM3 (ref 4.14–5.8)
WBC NRBC COR # BLD: 4.67 10*3/MM3 (ref 3.4–10.8)

## 2022-04-26 PROCEDURE — 99214 OFFICE O/P EST MOD 30 MIN: CPT | Performed by: INTERNAL MEDICINE

## 2022-04-26 PROCEDURE — 85025 COMPLETE CBC W/AUTO DIFF WBC: CPT

## 2022-04-26 PROCEDURE — 36415 COLL VENOUS BLD VENIPUNCTURE: CPT

## 2022-04-26 RX ORDER — FLUTICASONE PROPIONATE 50 MCG
2 SPRAY, SUSPENSION (ML) NASAL DAILY
COMMUNITY

## 2022-04-26 NOTE — PROGRESS NOTES
.     REASON FOR FOLLOWUP:     *  Recurrent lower extremity acute DVT.   · Episode of right leg calf vein DVT in June 2020 after long distance travel by automobile, treated with Eliquis for 6 weeks.  · Left common femoral vein acute DVT discovered on 5/28/2021.  Patient was on clomiphene.  Patient was started on Eliquis anticoagulation 5 mg twice a day.    · Venous Doppler study on 10/6/2021 reported complete resolution of the left leg DVT.    · In October 2021, Eliquis was decreased to 2.5 mg twice a day.                        HISTORY OF PRESENT ILLNESS:  The patient is a 61 y.o. year old male with seasonal allergies and recurrent leg DVT who presented today for 6-month reevaluation.      Patient reports he is doing well, he continues to wear compression stocks on both lower legs.  Denies edema in the legs or pain.  No chest pain or dyspnea.  Patient continues low-dose Eliquis 2.5 mg twice a day since last visit here in October 2021.  He reports no easy bleeding or bruising either.    Laboratory studies on 10/6/2021 reported normalized protein C activity 88%, and normalized Antithrombin activity 102% and negative for lupus anticoagulant.  It also reported normal CBC.    Venous Doppler study of the left leg on 10/6/2021 reported no evidence for venous thrombosis at all.          Past Medical History:   Diagnosis Date   • Acute deep vein thrombosis (DVT) of femoral vein of left lower extremity (HCC) 6/18/2021   • Allergic Years ago   • Deep vein thrombosis (DVT) (HCC)     RLE   • Erectile dysfunction 2 years ago   • Hemangioma of liver    • Hemangioma of liver 7/1/2021   • Low protein C activity level (HCC) 7/1/2021   • Seasonal allergies    • Shoulder injury surgery years ago     Past Surgical History:   Procedure Laterality Date   • COLONOSCOPY  2011   • COLONOSCOPY N/A 12/7/2020    Procedure: COLONOSCOPY TO CECUM AND TERMINAL ILEUM;  Surgeon: Uche Drew MD;  Location: [facility];  Service:  Gastroenterology;  Laterality: N/A;  PRE- FAMILY HISTORY OF COLON CANCER  POST- DIVERTICULOSIS, HEMORRHOIDS   • EYE SURGERY      laser surgery   • SHOULDER SURGERY Bilateral 2011    exploratory arthroscopy   • VASECTOMY      Years ago   • WISDOM TOOTH EXTRACTION       HEMATOLOGY HISTORY: The patient is a 60 y.o. year old male with seasonal allergies but otherwise no chronic medical problem who presented on 6/18/2020 for recurrent acute DVT. He is referred by Vascular Surgeon, Alf Schwartz MD.     This patient had 1st episode of left calf vein DVT in 06/2020, for which he was given Eliquis for about 6 weeks according to patient himself. He had long distance traveling prior to that episode of DVT, from [place] to [place], with no stopping and resting on the way.     This time in May 2021 the patient had long distance travel again, however he was stopping frequently and drinking plenty of water, nevertheless he complained of pain and palpable venous cord on his medial side of his left thigh and he noted left calf pain earlier in 05/2021 when he was down in [place]. He actually went to a local emergency room, with venous Doppler study negative. After he returned to [place], patient had a venous Doppler study on 5/6/2021 reported no evidence for lower extremity DVT in the left leg.  However patient complains worsening of tightness and redness in the left thigh. When he presented to Dr. Schwartz's office on 05/28/2021 for reevaluation there was a palpable red cord in the left thigh. It was tender to palpation. The patient had repeat venous Doppler study on 05/28/2021 which reported acute left common femoral vein DVT, there was also acute thrombophlebitis in the left great saphenous vein. The right leg is negative for any venous thrombosis.     The patient was started on Eliquis 5 mg for anticoagulation.     This patient has history of abnormal CT of the abdomen obtained on 07/06/2020 which reported liver  lesions, and up to 4.8 cm. The patient subsequently had abdomen MRI examination which reported possible hepatic hemangioma. The patient also had evidence of renal cyst.     This patient has no significant history of cigarette smoking. He has no family history for thrombosis.    Comprehensive hypercoagulable work-up on 6/18/2021 reported slightly decreased Antithrombin activity 77% with normal above 90%, slightly decreased protein C activity 78%, normal above 86%, but normal protein S activity 122% and protein S free antigen 116%.  It was negative for factor II mutation.  Laboratory studies were also positive for lupus anticoagulant, however negative for other antiphospholipid antibodies including Beta-2 Glycoprotein Antibodies, Cardiolipin Antibody, Phosphatidylserine Antibodies, Antiphosphotidyl Antibodies Panel II.     CT scan examination for chest abdomen pelvis obtained on 6/25/2021 reported multiple liver lesions, fits with hepatic hemangioma largest 5 cm, and also a 3 cm right renal cyst.  There was moderate sigmoid diverticulosis.  There is no evidence of malignancy, no lymphadenopathy.    I spoke with the insurance company physician, discussed the current condition, and certainly factor V Leiden mutation will be very important in determining the long-term treatment plan.  If he is positive for factor V Leiden mutation, most likely he will need lifelong anticoagulation.  Otherwise 12 months anticoagulation treatment would be sufficient if he had complete resolution of DVT.    We obtained authorization for the factor V Leiden test #[id number].    His blood sample stored in the laboratory was tested negative for factor V Leiden mutation.    MEDICATIONS    Current Outpatient Medications:   •  apixaban (ELIQUIS) 2.5 MG tablet tablet, Take 1 tablet by mouth Every 12 (Twelve) Hours. (Patient taking differently: Take 2.5 mg by mouth Every 12 (Twelve) Hours. 5mg BID), Disp: 180 tablet, Rfl: 1  •  cetirizine (zyrTEC)  "10 MG tablet, Take 10 mg by mouth Daily., Disp: , Rfl:   •  fluticasone (FLONASE) 50 MCG/ACT nasal spray, 2 sprays into the nostril(s) as directed by provider Daily., Disp: , Rfl:   •  Multiple Vitamins-Minerals (MULTIVITAMIN ADULT EXTRA C PO), , Disp: , Rfl:   •  NP Thyroid 15 MG tablet, , Disp: , Rfl:   •  NP Thyroid 60 MG tablet, , Disp: , Rfl:     ALLERGIES:   No Known Allergies    SOCIAL HISTORY:       Social History     Socioeconomic History   • Marital status:      Spouse name: Rupa   Tobacco Use   • Smoking status: Never Smoker   • Smokeless tobacco: Never Used   Vaping Use   • Vaping Use: Never used   Substance and Sexual Activity   • Alcohol use: Yes     Types: 2 Glasses of wine per week     Comment: social   • Drug use: No   • Sexual activity: Yes     Partners: Female         FAMILY HISTORY:  Family History   Problem Relation Age of Onset   • Liver cancer Mother    • Cancer Mother             • Other Mother         Blood clots   · No family history of thrombophilia.              Vitals:    22 0750   BP: 127/78   Pulse: 61   Resp: 18   Temp: 96.9 °F (36.1 °C)   TempSrc: Temporal   SpO2: 95%   Weight: 83 kg (182 lb 14.4 oz)   Height: 177.8 cm (70\")   PainSc: 0-No pain     Current Status 10/15/2021   ECOG score 0      PHYSICAL EXAM:    GENERAL:  Well-developed, well-nourished  male, in no acute distress.   SKIN:  Warm, dry without rashes, purpura or petechiae.  HEENT:  Normocephalic.  Wearing mask.   LYMPHATICS:  No cervical, supraclavicular adenopathy.  CHEST: Normal respiratory effort.  Lungs clear to auscultation. Good airflow.  CARDIAC:  Regular rate and rhythm without murmurs. Normal S1,S2.  ABDOMEN:  Soft, nontender with no organomegaly or masses.  Bowel sounds normal.  EXTREMITIES:  No clubbing, cyanosis or edema.  Patient wears compression stocks on both lower legs.  NEUROLOGICAL:  Grossly intact.  No focal neurological deficits.  PSYCHIATRIC:  Normal affect and mood.  "         RECENT LABS:  Lab Results   Component Value Date    WBC 4.67 04/26/2022    HGB 14.5 04/26/2022    HCT 41.6 04/26/2022    MCV 88.9 04/26/2022     04/26/2022     Lab Results   Component Value Date    NEUTROABS 2.33 04/26/2022       IMAGING STUDY:  Venous Doppler study on 10/6/2021  Duplex Venous Lower Extremity - Left CAR  · Normal left lower extremity venous duplex scan.              Assessment/Plan     ASSESSMENT:   *  Recurrent leg acute DVT.  · Had right leg calf vein acute DVT in 06/2020 after long distance travel.  Treated with Eliquis for 6 weeks.   · Left leg DVT happened with much significant left common femoral vein acute DVT together with the left greater saphenous vein superficial thrombophlebitis diagnosed on 05/28/2021 after long distance travel, however he was stopping frequently and drinking plenty of water.   · Comprehensive laboratory work-up for hypercoagulable status 6/18/2021 reported mildly decreased antithrombin activity 77%, and protein C activity 78% and mildly positive lupus anticoagulant.  Insurance company declined factor V Leiden mutation study.  Otherwise negative including normal protein S, negative factor II mutation, and antiphospholipid antibodies.    · On 7/1/2021 patient reports his left leg edema and pain has improved.  He uses compression socks.  I discussed with the patient, about laboratory results, I think the mildly decreased protein C activity and Antithrombin activity was consumption and not the true culprit of his recurrent DVT.  His weakly positive lupus anticoagulant could be caused by Eliquis.  I recommended to repeat this in the future.  In the meantime I recommended anticoagulation for 12 months starting from his diagnosis in the end of May 2021.  Nevertheless we need to have factor V Leiden mutation study, together repeating the abnormal studies including protein C activity, Antithrombin activity and antilupus anticoagulant in 4 months.  I also would  like to have repeated a venous Doppler study at that time.  Whether this patient needs more than 12 months anticoagulation or even lifelong anticoagulation needs to be discussed, obtaining laboratory study results and how much response he has towards Eliquis anticoagulation.    · He was also tested negative for factor V Leiden mutation.  · Repeat laboratory study on 10/6/2021 reported negative for lupus anticoagulant, normalized Antithrombin activity and protein C activity.  So  patient has negative hypercoagulable work-up.  · Venous Doppler study on 10/6/2021 reported complete resolution of left leg DVT.  · Discussed with the patient on 10/15/2021 about lab results and also venous study reports.  Since he has complete resolution of DVT, I recommended to decrease Eliquis to 2.5 mg twice a day for 6 months.  Patient is agreeable.  · Discussed with patient 4/26/2022, he could discontinue Eliquis anticoagulation.  Advised patient to use prophylactic Eliquis 2.5 mg on the day before and the day of flight.  He still has some leftover Eliquis.    *Liver lesions.  As reported as hemangioma.  It looks stable per radiologist.    *Right kidney cyst 3 cm.  This is benign.  No evidence of malignancy by CT scan examination for chest abdomen pelvis.      PLAN:  1. Discontinue Eliquis.  2. Prior to future travel, he could take Eliquis 2.5 mg the day before and also 2.5 mg on the day of his flight.  He still has some leftover pills.  3. No need to follow-up with us routinely.  If needed be happy to see him again.      Discussed with the patient about the management plan.       JAM HERNANDEZ M.D., Ph.D.    4/26/2022.        CC:   MD Dale García Jr. DO

## 2022-10-12 ENCOUNTER — OFFICE VISIT (OUTPATIENT)
Dept: FAMILY MEDICINE CLINIC | Facility: CLINIC | Age: 62
End: 2022-10-12

## 2022-10-12 VITALS
BODY MASS INDEX: 26.26 KG/M2 | OXYGEN SATURATION: 97 % | RESPIRATION RATE: 18 BRPM | WEIGHT: 183 LBS | SYSTOLIC BLOOD PRESSURE: 126 MMHG | DIASTOLIC BLOOD PRESSURE: 76 MMHG | HEART RATE: 59 BPM

## 2022-10-12 DIAGNOSIS — Z00.00 ROUTINE HEALTH MAINTENANCE: Primary | ICD-10-CM

## 2022-10-12 DIAGNOSIS — Z13.6 ENCOUNTER FOR LIPID SCREENING FOR CARDIOVASCULAR DISEASE: ICD-10-CM

## 2022-10-12 DIAGNOSIS — Z13.220 ENCOUNTER FOR LIPID SCREENING FOR CARDIOVASCULAR DISEASE: ICD-10-CM

## 2022-10-12 DIAGNOSIS — Z13.1 SCREENING FOR DIABETES MELLITUS: ICD-10-CM

## 2022-10-12 PROBLEM — J30.1 SEASONAL ALLERGIC RHINITIS DUE TO POLLEN: Status: ACTIVE | Noted: 2022-10-12

## 2022-10-12 PROCEDURE — 99396 PREV VISIT EST AGE 40-64: CPT | Performed by: FAMILY MEDICINE

## 2022-10-12 RX ORDER — APIXABAN 5 MG/1
5 TABLET, FILM COATED ORAL 2 TIMES DAILY
COMMUNITY
Start: 2022-10-11

## 2022-10-12 NOTE — PROGRESS NOTES
Chief Complaint  Establish Care    Subjective    History of Present Illness {  Problem List  Visit  Diagnosis   Encounters  Notes  Medications  Labs  Result Review Imaging  Media :23}     Yevgeniy Rodriguez presents to [facility] for Establish Care.  History of Present Illness     Here today to establish care and for annual exam.    Doing well overall. Considers himself fairly healthy all things concerned. Does have a history of repeated DVTs in his left lower extremity. Coagulopathy work-up has been mostly negative. He did have 1 abnormal protein C activity that was normal upon recheck. He is on Eliquis 5 mg twice daily. Follows up with hematology regularly.    Otherwise takes some over-the-counter nasal steroid and antihistamine as needed for chronic allergic rhinitis. Reports good adherence and tolerance. No refills needed at this time.    Fairly well caught up with preventive health recommendations. Getting a COVID booster and a flu shot at work upcoming.    No major changes to diet or exercise. Weight has been stable over the past several years.    Has good family and social support. Enjoys his work. Gets regular dental checks.    Objective     Vital Signs:   /76   Pulse 59   Resp 18   Wt 83 kg (183 lb)   SpO2 97%   BMI 26.26 kg/m²   Physical Exam  Vitals and nursing note reviewed.   Constitutional:       General: He is not in acute distress.     Appearance: Normal appearance. He is not ill-appearing.   Cardiovascular:      Rate and Rhythm: Normal rate and regular rhythm.      Pulses: Normal pulses.      Heart sounds: Normal heart sounds. No murmur heard.  Pulmonary:      Effort: Pulmonary effort is normal. No respiratory distress.      Breath sounds: Normal breath sounds. No rales.   Neurological:      Mental Status: He is alert and oriented to person, place, and time. Mental status is at baseline.   Psychiatric:         Mood and Affect: Mood normal.          Behavior: Behavior normal.          Result Review  Data Reviewed:{ Labs  Result Review  Imaging  Med Tab  Media :23}                   Assessment and Plan {CC Problem List  Visit Diagnosis  ROS  Review (Popup)  Health Maintenance  Quality  BestPractice  Medications  SmartSets  SnapShot Encounters  Media :23}   Diagnoses and all orders for this visit:    1. Routine health maintenance (Primary)    2. Screening for diabetes mellitus  -     Comprehensive Metabolic Panel    3. Encounter for lipid screening for cardiovascular disease  -     Lipid Panel    Orders as above. I will contact him with results as available.    Follow-up with hematology as scheduled. Continue Eliquis as prescribed.    Encouraged to continue with healthy lifestyle habits. Recommended follow-up as below. Encouraged communication via agreement24 [email or web address] in the meantime.    Patient was given instructions and counseling regarding his condition or for health maintenance advice. Please see specific information pulled into the AVS (placed there by myself) if appropriate.    Return in about 3 months (around 1/12/2023), or if symptoms worsen or fail to improve, for f/u thyroid.      M. Lary Juan MD    Prevention counseling performed as below: Mindfulness for stress management.

## 2022-10-13 LAB
ALBUMIN SERPL-MCNC: 4.6 G/DL (ref 3.5–5.2)
ALBUMIN/GLOB SERPL: 2.3 G/DL
ALP SERPL-CCNC: 74 U/L (ref 39–117)
ALT SERPL-CCNC: 17 U/L (ref 1–41)
AST SERPL-CCNC: 26 U/L (ref 1–40)
BILIRUB SERPL-MCNC: 0.4 MG/DL (ref 0–1.2)
BUN SERPL-MCNC: 13 MG/DL (ref 8–23)
BUN/CREAT SERPL: 13.7 (ref 7–25)
CALCIUM SERPL-MCNC: 9.3 MG/DL (ref 8.6–10.5)
CHLORIDE SERPL-SCNC: 106 MMOL/L (ref 98–107)
CHOLEST SERPL-MCNC: 193 MG/DL (ref 0–200)
CO2 SERPL-SCNC: 29.5 MMOL/L (ref 22–29)
CREAT SERPL-MCNC: 0.95 MG/DL (ref 0.76–1.27)
EGFRCR SERPLBLD CKD-EPI 2021: 91.6 ML/MIN/1.73
GLOBULIN SER CALC-MCNC: 2 GM/DL
GLUCOSE SERPL-MCNC: 102 MG/DL (ref 65–99)
HDLC SERPL-MCNC: 68 MG/DL (ref 40–60)
LDLC SERPL CALC-MCNC: 113 MG/DL (ref 0–100)
POTASSIUM SERPL-SCNC: 5.1 MMOL/L (ref 3.5–5.2)
PROT SERPL-MCNC: 6.6 G/DL (ref 6–8.5)
SODIUM SERPL-SCNC: 144 MMOL/L (ref 136–145)
TRIGL SERPL-MCNC: 65 MG/DL (ref 0–150)
VLDLC SERPL CALC-MCNC: 12 MG/DL (ref 5–40)

## 2022-10-14 ENCOUNTER — PATIENT ROUNDING (BHMG ONLY) (OUTPATIENT)
Dept: FAMILY MEDICINE CLINIC | Facility: CLINIC | Age: 62
End: 2022-10-14

## 2022-10-14 NOTE — PROGRESS NOTES
A [employer] message has been sent to the patient for PATIENT R[place]ING with [facility]

## 2022-12-13 ENCOUNTER — OFFICE VISIT (OUTPATIENT)
Dept: FAMILY MEDICINE CLINIC | Facility: CLINIC | Age: 62
End: 2022-12-13

## 2022-12-13 VITALS
HEART RATE: 59 BPM | OXYGEN SATURATION: 99 % | DIASTOLIC BLOOD PRESSURE: 80 MMHG | BODY MASS INDEX: 26.77 KG/M2 | WEIGHT: 187 LBS | SYSTOLIC BLOOD PRESSURE: 118 MMHG | HEIGHT: 70 IN | RESPIRATION RATE: 16 BRPM

## 2022-12-13 DIAGNOSIS — M79.674 GREAT TOE PAIN, RIGHT: Primary | ICD-10-CM

## 2022-12-13 DIAGNOSIS — B35.1 ONYCHOMYCOSIS: ICD-10-CM

## 2022-12-13 PROCEDURE — 99213 OFFICE O/P EST LOW 20 MIN: CPT | Performed by: NURSE PRACTITIONER

## 2022-12-13 NOTE — PROGRESS NOTES
"Subjective   Yevgeniy Rodriguez is a 62 y.o. male.   Toe Pain    History of Present Illness   Pt of Dr Juan , new to this provider   here w/ c/o Rt great toe pain.  He states he believes he hit tow on something in May.  This pain started 1 mo ago after he did a big hike [place]. Toenail is discolored since hike. He is unsure if this is damage to nail or toenail fungus. No joint pain, erythema, warmth or hx of gout. No acute injury. He has cut a V shape at top of toenail to distribute pressure. Epsom salt soaks help.     H/o DVTs, on eliquis 5 bid. Wears compression socks.     Review of Systems    Objective   Vitals:    12/13/22 0748   BP: 118/80   Pulse: 59   Resp: 16   SpO2: 99%   Weight: 84.8 kg (187 lb)   Height: 177.8 cm (70\")      Body mass index is 26.83 kg/m².    Physical Exam  Vitals reviewed.   Constitutional:       Appearance: Normal appearance.   HENT:      Mouth/Throat:      Mouth: Mucous membranes are moist.   Cardiovascular:      Rate and Rhythm: Normal rate and regular rhythm.   Pulmonary:      Effort: Pulmonary effort is normal.      Breath sounds: Normal breath sounds.   Abdominal:      General: Bowel sounds are normal.      Palpations: Abdomen is soft.   Musculoskeletal:         General: Normal range of motion.        Feet:    Feet:      Comments: Discolored toenail, no erythema, no swelling, no dng no heat  Skin:     General: Skin is warm.   Neurological:      General: No focal deficit present.      Mental Status: He is alert.           Assessment & Plan   Problem List Items Addressed This Visit    None  Visit Diagnoses     Great toe pain, right    -  Primary    Onychomycosis                 No orders of the defined types were placed in this encounter.     Great toe pain/onychomycosis- allow nail to grow out, cut straight across, soak in epsom salt, apply vicks . He declines podiatry referral as he feels this is improving. Tylenol as needed     Return if symptoms worsen or fail " to improve.

## 2023-01-12 ENCOUNTER — OFFICE VISIT (OUTPATIENT)
Dept: FAMILY MEDICINE CLINIC | Facility: CLINIC | Age: 63
End: 2023-01-12
Payer: COMMERCIAL

## 2023-01-12 VITALS
WEIGHT: 189 LBS | SYSTOLIC BLOOD PRESSURE: 128 MMHG | OXYGEN SATURATION: 94 % | DIASTOLIC BLOOD PRESSURE: 82 MMHG | HEART RATE: 68 BPM | BODY MASS INDEX: 27.12 KG/M2

## 2023-01-12 DIAGNOSIS — E66.3 OVERWEIGHT (BMI 25.0-29.9): Primary | ICD-10-CM

## 2023-01-12 DIAGNOSIS — B35.1 ONYCHOMYCOSIS: ICD-10-CM

## 2023-01-12 PROCEDURE — 99213 OFFICE O/P EST LOW 20 MIN: CPT | Performed by: FAMILY MEDICINE

## 2023-01-12 NOTE — PROGRESS NOTES
Chief Complaint  Deep Vein Thrombosis    Subjective    History of Present Illness {CC  Problem List  Visit  Diagnosis   Encounters  Notes  Medications  Labs  Result Review Imaging  Media :23}     Yevgeniy Rodriguez presents to [facility] for Deep Vein Thrombosis.  History of Present Illness     Here today for follow-up as above. Has been doing quite well. Continues on Eliquis, reports good urine tolerance. No need for refills at this time.    Has some questions about weight loss. Having a difficult time losing few pounds. Has some questions about diet and exercise.    Has also had some onychomycosis on his toenails. Seems to be slowly improving. Has some general questions about management.    Objective     Vital Signs:   /82 (BP Location: Left arm, Patient Position: Sitting)   Pulse 68   Wt 85.7 kg (189 lb)   SpO2 94%   BMI 27.12 kg/m²   Physical Exam  Vitals and nursing note reviewed.   Constitutional:       General: He is not in acute distress.     Appearance: Normal appearance. He is not ill-appearing.   Cardiovascular:      Rate and Rhythm: Normal rate and regular rhythm.      Pulses: Normal pulses.      Heart sounds: Normal heart sounds. No murmur heard.  Pulmonary:      Effort: Pulmonary effort is normal. No respiratory distress.      Breath sounds: Normal breath sounds. No rales.   Skin:     Comments: Mild onychomycosis of the right great toenail   Neurological:      Mental Status: He is alert and oriented to person, place, and time. Mental status is at baseline.   Psychiatric:         Mood and Affect: Mood normal.         Behavior: Behavior normal.          Result Review  Data Reviewed:{ Labs  Result Review  Imaging  Med Tab  Media :23}                   Assessment and Plan {CC Problem List  Visit Diagnosis  ROS  Review (Popup)  Health Maintenance  Quality  BestPractice  Medications  SmartSets  SnapShot Encounters  Media :23}   Diagnoses and all  orders for this visit:    1. Overweight (BMI 25.0-29.9) (Primary)    2. Onychomycosis    Discussed some basics of diet and exercise with emphasis on avoidance of binge eating and eating more consistent meals throughout the day. He will keep me updated.    Discussed management of onychomycosis. Happy to discuss management with terbinafine as needed. He will let me know.    Recommended follow-up as below. Encouraged communication via MyChart in the meantime.    Patient was given instructions and counseling regarding his condition or for health maintenance advice. Please see specific information pulled into the AVS (placed there by myself) if appropriate.    Return in about 9 months (around 10/12/2023) for Preventive Health Maintenance.      LOKI Juan MD

## 2024-01-10 ENCOUNTER — OFFICE VISIT (OUTPATIENT)
Dept: FAMILY MEDICINE CLINIC | Facility: CLINIC | Age: 64
End: 2024-01-10
Payer: COMMERCIAL

## 2024-01-10 VITALS
SYSTOLIC BLOOD PRESSURE: 116 MMHG | HEIGHT: 70 IN | RESPIRATION RATE: 18 BRPM | HEART RATE: 57 BPM | BODY MASS INDEX: 25.2 KG/M2 | DIASTOLIC BLOOD PRESSURE: 70 MMHG | WEIGHT: 176 LBS | OXYGEN SATURATION: 99 %

## 2024-01-10 DIAGNOSIS — E66.3 OVERWEIGHT WITH BODY MASS INDEX (BMI) OF 25 TO 25.9 IN ADULT: ICD-10-CM

## 2024-01-10 DIAGNOSIS — Z13.1 SCREENING FOR DIABETES MELLITUS: ICD-10-CM

## 2024-01-10 DIAGNOSIS — Z13.220 ENCOUNTER FOR LIPID SCREENING FOR CARDIOVASCULAR DISEASE: ICD-10-CM

## 2024-01-10 DIAGNOSIS — Z23 NEED FOR VACCINATION: ICD-10-CM

## 2024-01-10 DIAGNOSIS — Z00.00 ROUTINE HEALTH MAINTENANCE: Primary | ICD-10-CM

## 2024-01-10 DIAGNOSIS — Z13.6 ENCOUNTER FOR LIPID SCREENING FOR CARDIOVASCULAR DISEASE: ICD-10-CM

## 2024-01-10 PROCEDURE — 99396 PREV VISIT EST AGE 40-64: CPT | Performed by: FAMILY MEDICINE

## 2024-01-10 NOTE — PROGRESS NOTES
"Chief Complaint  Annual Exam    Subjective    History of Present Illness    Yevgeniy Rodriguez presents to [facility] for Annual Exam.  History of Present Illness     Here today for an annual exam and to discuss preventive health maintenance.    Doing well overall, no real questions or concerns. Feels that he is overall quite healthy. Would like to get some routine blood work. Is otherwise caught up on preventive health recommendations.    Has been slowly losing weight over the past year, happy with the results. Has been working on both diet and exercise. Planning on retiring in July. Plans on being \"quite active during [employer].\"     Continues on current regimen as prescribed. No refills needed at this time.    Has good family and social support. Enjoys his work. Gets regular dental exams.    Objective     Vital Signs:   /70   Pulse 57   Resp 18   Ht 177.8 cm (70\")   Wt 79.8 kg (176 lb)   SpO2 99%   BMI 25.25 kg/m²   Physical Exam  Vitals and nursing note reviewed.   Constitutional:       General: He is not in acute distress.     Appearance: Normal appearance. He is not ill-appearing.   Cardiovascular:      Rate and Rhythm: Normal rate and regular rhythm.      Pulses: Normal pulses.      Heart sounds: Normal heart sounds. No murmur heard.  Pulmonary:      Effort: Pulmonary effort is normal. No respiratory distress.      Breath sounds: Normal breath sounds. No rales.   Neurological:      Mental Status: He is alert and oriented to person, place, and time. Mental status is at baseline.   Psychiatric:         Mood and Affect: Mood normal.         Behavior: Behavior normal.          Result Review  Data Reviewed:                  Assessment and Plan   Diagnoses and all orders for this visit:    1. Routine health maintenance (Primary)    2. Overweight with body mass index (BMI) of 25 to 25.9 in adult    3. Screening for diabetes mellitus  -     Comprehensive Metabolic Panel    4. " Encounter for lipid screening for cardiovascular disease  -     Lipid Panel    5. Need for vaccination    Orders as above. I will contact him with results as available. Continue meds as prescribed. Will let me know as refills are needed.    BMI is >= 25 and <30. (Overweight) The following options were offered after discussion;: exercise counseling/recommendations and nutrition counseling/recommendations    Urged to continue working on healthy lifestyle habits. Recommended follow up as below. Encouraged communication via MyChart in the meantime.     Patient was given instructions and counseling regarding his condition or for health maintenance advice. Please see specific information pulled into the AVS (placed there by myself) if appropriate.    Return in about 1 year (around 1/10/2025), or if symptoms worsen or fail to improve, for Preventive Health Maintenance.    LOKI Juan MD    Prevention counseling performed as below: Mindfulness for stress management.

## 2024-01-10 NOTE — PATIENT INSTRUCTIONS
Mindfulness apps: Headspace, Smiling Mind, Cotter!    Mindfulness-Based Stress Reduction  Mindfulness-based stress reduction (MBSR) is a program that helps people learn to practice mindfulness. Mindfulness is the practice of intentionally paying attention to the present moment. It can be learned and practiced through techniques such as education, breathing exercises, meditation, and yoga. MBSR includes several mindfulness techniques in one program.  MBSR works best when you understand the treatment, are willing to try new things, and can commit to spending time practicing what you learn. MBSR training may include learning about:  How your emotions, thoughts, and reactions affect your body.  New ways to respond to things that cause negative thoughts to start (triggers).  How to notice your thoughts and let go of them.  Practicing awareness of everyday things that you normally do without thinking.  The techniques and goals of different types of meditation.  What are the benefits of MBSR?  MBSR can have many benefits, which include helping you to:  Develop self-awareness. This refers to knowing and understanding yourself.  Learn skills and attitudes that help you to participate in your own health care.  Learn new ways to care for yourself.  Be more accepting about how things are, and let things go.  Be less judgmental and approach things with an open mind.  Be patient with yourself and trust yourself more.  MBSR has also been shown to:  Reduce negative emotions, such as depression and anxiety.  Improve memory and focus.  Change how you sense and approach pain.  Boost your body's ability to fight infections.  Help you connect better with other people.  Improve your sense of well-being.  Follow these instructions at home:    Find a local in-person or online MBSR program.  Set aside some time regularly for mindfulness practice.  Find a mindfulness practice that works best for you. This may include one or more of the  following:  Meditation. Meditation involves focusing your mind on a certain thought or activity.  Breathing awareness exercises. These help you to stay present by focusing on your breath.  Body scan. For this practice, you lie down and pay attention to each part of your body from head to toe. You can identify tension and soreness and intentionally relax parts of your body.  Yoga. Yoga involves stretching and breathing, and it can improve your ability to move and be flexible. It can also provide an experience of testing your body's limits, which can help you release stress.  Mindful eating. This way of eating involves focusing on the taste, texture, color, and smell of each bite of food. Because this slows down eating and helps you feel full sooner, it can be an important part of a weight-loss plan.  Find a podcast or recording that provides guidance for breathing awareness, body scan, or meditation exercises. You can listen to these any time when you have a free moment to rest without distractions.  Follow your treatment plan as told by your health care provider. This may include taking regular medicines and making changes to your diet or lifestyle as recommended.  How to practice mindfulness  To do a basic awareness exercise:  Find a comfortable place to sit.  Pay attention to the present moment. Observe your thoughts, feelings, and surroundings just as they are.  Avoid placing judgment on yourself, your feelings, or your surroundings. Make note of any judgment that comes up, and let it go.  Your mind may wander, and that is okay. Make note of when your thoughts drift, and return your attention to the present moment.  To do basic mindfulness meditation:  Find a comfortable place to sit. This may include a stable chair or a firm floor cushion.  Sit upright with your back straight. Let your arms fall next to your side with your hands resting on your legs.  If sitting in a chair, rest your feet flat on the floor.  If  sitting on a cushion, cross your legs in front of you.  Keep your head in a neutral position with your chin dropped slightly. Relax your jaw and rest the tip of your tongue on the roof of your mouth. Drop your gaze to the floor. You can close your eyes if you like.  Breathe normally and pay attention to your breath. Feel the air moving in and out of your nose. Feel your belly expanding and relaxing with each breath.  Your mind may wander, and that is okay. Make note of when your thoughts drift, and return your attention to your breath.  Avoid placing judgment on yourself, your feelings, or your surroundings. Make note of any judgment or feelings that come up, let them go, and bring your attention back to your breath.  When you are ready, lift your gaze or open your eyes. Pay attention to how your body feels after the meditation.  Where to find more information  You can find more information about MBSR from:  Your health care provider.  Community-based meditation centers or programs.  Programs offered near you.  Summary  Mindfulness-based stress reduction (MBSR) is a program that teaches you how to intentionally pay attention to the present moment. It is used with other treatments to help you cope better with daily stress, emotions, and pain.  MBSR focuses on developing self-awareness, which allows you to respond to life stress without judgment or negative emotions.  MBSR programs may involve learning different mindfulness practices, such as breathing exercises, meditation, yoga, body scan, or mindful eating. Find a mindfulness practice that works best for you, and set aside time for it on a regular basis.  This information is not intended to replace advice given to you by your health care provider. Make sure you discuss any questions you have with your health care provider.  Document Released: 04/26/2018 Document Revised: 11/30/2018 Document Reviewed: 04/26/2018  [employer] Patient Education © 2020 [employer]

## 2024-01-11 LAB
ALBUMIN SERPL-MCNC: 4.6 G/DL (ref 3.9–4.9)
ALBUMIN/GLOB SERPL: 1.8 {RATIO} (ref 1.2–2.2)
ALP SERPL-CCNC: 76 IU/L (ref 44–121)
ALT SERPL-CCNC: 19 IU/L (ref 0–44)
AST SERPL-CCNC: 27 IU/L (ref 0–40)
BILIRUB SERPL-MCNC: 0.5 MG/DL (ref 0–1.2)
BUN SERPL-MCNC: 16 MG/DL (ref 8–27)
BUN/CREAT SERPL: 16 (ref 10–24)
CALCIUM SERPL-MCNC: 9.5 MG/DL (ref 8.6–10.2)
CHLORIDE SERPL-SCNC: 101 MMOL/L (ref 96–106)
CHOLEST SERPL-MCNC: 217 MG/DL (ref 100–199)
CO2 SERPL-SCNC: 24 MMOL/L (ref 20–29)
CREAT SERPL-MCNC: 1 MG/DL (ref 0.76–1.27)
EGFRCR SERPLBLD CKD-EPI 2021: 85 ML/MIN/1.73
GLOBULIN SER CALC-MCNC: 2.6 G/DL (ref 1.5–4.5)
GLUCOSE SERPL-MCNC: 94 MG/DL (ref 70–99)
HDLC SERPL-MCNC: 74 MG/DL
LDLC SERPL CALC-MCNC: 131 MG/DL (ref 0–99)
POTASSIUM SERPL-SCNC: 4.3 MMOL/L (ref 3.5–5.2)
PROT SERPL-MCNC: 7.2 G/DL (ref 6–8.5)
SODIUM SERPL-SCNC: 140 MMOL/L (ref 134–144)
TRIGL SERPL-MCNC: 69 MG/DL (ref 0–149)
VLDLC SERPL CALC-MCNC: 12 MG/DL (ref 5–40)

## 2024-12-12 DIAGNOSIS — N52.9 ERECTILE DYSFUNCTION, UNSPECIFIED ERECTILE DYSFUNCTION TYPE: Primary | ICD-10-CM

## 2024-12-12 RX ORDER — TADALAFIL 5 MG/1
5 TABLET ORAL DAILY PRN
Qty: 30 TABLET | Refills: 0 | Status: SHIPPED | OUTPATIENT
Start: 2024-12-12

## 2025-08-21 ENCOUNTER — OFFICE VISIT (OUTPATIENT)
Dept: FAMILY MEDICINE CLINIC | Facility: CLINIC | Age: 65
End: 2025-08-21
Payer: COMMERCIAL

## 2025-08-21 VITALS
HEIGHT: 70 IN | HEART RATE: 61 BPM | OXYGEN SATURATION: 97 % | RESPIRATION RATE: 14 BRPM | DIASTOLIC BLOOD PRESSURE: 84 MMHG | WEIGHT: 181.4 LBS | SYSTOLIC BLOOD PRESSURE: 104 MMHG | BODY MASS INDEX: 25.97 KG/M2

## 2025-08-21 DIAGNOSIS — Z23 NEED FOR VACCINATION: ICD-10-CM

## 2025-08-21 DIAGNOSIS — Z13.220 ENCOUNTER FOR LIPID SCREENING FOR CARDIOVASCULAR DISEASE: ICD-10-CM

## 2025-08-21 DIAGNOSIS — Z00.00 ROUTINE HEALTH MAINTENANCE: Primary | ICD-10-CM

## 2025-08-21 DIAGNOSIS — Z13.6 ENCOUNTER FOR LIPID SCREENING FOR CARDIOVASCULAR DISEASE: ICD-10-CM

## 2025-08-21 DIAGNOSIS — Z13.1 SCREENING FOR DIABETES MELLITUS: ICD-10-CM

## 2025-08-21 PROBLEM — M19.012 OSTEOARTHRITIS OF BOTH SHOULDERS: Status: ACTIVE | Noted: 2023-07-27

## 2025-08-21 PROBLEM — M19.011 OSTEOARTHRITIS OF BOTH SHOULDERS: Status: ACTIVE | Noted: 2023-07-27

## 2025-08-21 LAB
ALBUMIN SERPL-MCNC: 4.4 G/DL (ref 3.5–5.2)
ALBUMIN/GLOB SERPL: 1.8 G/DL
ALP SERPL-CCNC: 73 U/L (ref 39–117)
ALT SERPL-CCNC: 15 U/L (ref 1–41)
AST SERPL-CCNC: 24 U/L (ref 1–40)
BILIRUB SERPL-MCNC: 0.6 MG/DL (ref 0–1.2)
BUN SERPL-MCNC: 19 MG/DL (ref 8–23)
BUN/CREAT SERPL: 17.6 (ref 7–25)
CALCIUM SERPL-MCNC: 9.1 MG/DL (ref 8.6–10.5)
CHLORIDE SERPL-SCNC: 104 MMOL/L (ref 98–107)
CHOLEST SERPL-MCNC: 210 MG/DL (ref 0–200)
CO2 SERPL-SCNC: 25.3 MMOL/L (ref 22–29)
CREAT SERPL-MCNC: 1.08 MG/DL (ref 0.76–1.27)
EGFRCR SERPLBLD CKD-EPI 2021: 77.1 ML/MIN/1.73
GLOBULIN SER CALC-MCNC: 2.4 GM/DL
GLUCOSE SERPL-MCNC: 97 MG/DL (ref 65–99)
HDLC SERPL-MCNC: 64 MG/DL (ref 40–60)
LDLC SERPL CALC-MCNC: 135 MG/DL (ref 0–100)
POTASSIUM SERPL-SCNC: 4.5 MMOL/L (ref 3.5–5.2)
PROT SERPL-MCNC: 6.8 G/DL (ref 6–8.5)
SODIUM SERPL-SCNC: 140 MMOL/L (ref 136–145)
TRIGL SERPL-MCNC: 61 MG/DL (ref 0–150)
VLDLC SERPL CALC-MCNC: 11 MG/DL (ref 5–40)

## (undated) DEVICE — THE TORRENT IRRIGATION SCOPE CONNECTOR IS USED WITH THE TORRENT IRRIGATION TUBING TO PROVIDE IRRIGATION FLUIDS SUCH AS STERILE WATER DURING GASTROINTESTINAL ENDOSCOPIC PROCEDURES WHEN USED IN CONJUNCTION WITH AN IRRIGATION PUMP (OR ELECTROSURGICAL UNIT).: Brand: TORRENT

## (undated) DEVICE — KT ORCA ORCAPOD DISP STRL

## (undated) DEVICE — SENSR O2 OXIMAX FNGR A/ 18IN NONSTR

## (undated) DEVICE — ADAPT CLN BIOGUARD AIR/H2O DISP

## (undated) DEVICE — CANN O2 ETCO2 FITS ALL CONN CO2 SMPL A/ 7IN DISP LF

## (undated) DEVICE — TUBING, SUCTION, 1/4" X 10', STRAIGHT: Brand: MEDLINE

## (undated) DEVICE — LN SMPL CO2 SHTRM SD STREAM W/M LUER